# Patient Record
Sex: MALE | Race: BLACK OR AFRICAN AMERICAN | Employment: OTHER | URBAN - METROPOLITAN AREA
[De-identification: names, ages, dates, MRNs, and addresses within clinical notes are randomized per-mention and may not be internally consistent; named-entity substitution may affect disease eponyms.]

---

## 2018-08-29 ENCOUNTER — HOSPITAL ENCOUNTER (EMERGENCY)
Age: 68
Discharge: HOME | End: 2018-08-29
Attending: EMERGENCY MEDICINE | Admitting: EMERGENCY MEDICINE
Payer: MEDICARE

## 2018-08-29 ENCOUNTER — APPOINTMENT (OUTPATIENT)
Dept: GENERAL RADIOLOGY | Age: 68
End: 2018-08-29
Attending: EMERGENCY MEDICINE
Payer: MEDICARE

## 2018-08-29 VITALS
HEART RATE: 62 BPM | HEIGHT: 70 IN | OXYGEN SATURATION: 96 % | DIASTOLIC BLOOD PRESSURE: 79 MMHG | TEMPERATURE: 98.1 F | BODY MASS INDEX: 25.05 KG/M2 | WEIGHT: 175 LBS | SYSTOLIC BLOOD PRESSURE: 169 MMHG | RESPIRATION RATE: 19 BRPM

## 2018-08-29 DIAGNOSIS — R93.89 ABNORMAL CHEST X-RAY: ICD-10-CM

## 2018-08-29 DIAGNOSIS — R03.0 ELEVATED BLOOD PRESSURE READING: ICD-10-CM

## 2018-08-29 DIAGNOSIS — R09.02 HYPOXIA: Primary | ICD-10-CM

## 2018-08-29 LAB
ANION GAP SERPL CALC-SCNC: 6 MMOL/L (ref 3–18)
ATRIAL RATE: 64 BPM
BASOPHILS # BLD: 0 K/UL (ref 0–0.1)
BASOPHILS NFR BLD: 0 % (ref 0–2)
BNP SERPL-MCNC: 548 PG/ML (ref 0–900)
BUN SERPL-MCNC: 17 MG/DL (ref 7–18)
BUN/CREAT SERPL: 18 (ref 12–20)
CALCIUM SERPL-MCNC: 8.6 MG/DL (ref 8.5–10.1)
CALCULATED P AXIS, ECG09: 39 DEGREES
CALCULATED R AXIS, ECG10: 36 DEGREES
CALCULATED T AXIS, ECG11: -120 DEGREES
CHLORIDE SERPL-SCNC: 107 MMOL/L (ref 100–108)
CO2 SERPL-SCNC: 26 MMOL/L (ref 21–32)
CREAT SERPL-MCNC: 0.95 MG/DL (ref 0.6–1.3)
DIAGNOSIS, 93000: NORMAL
DIFFERENTIAL METHOD BLD: ABNORMAL
EOSINOPHIL # BLD: 0.2 K/UL (ref 0–0.4)
EOSINOPHIL NFR BLD: 3 % (ref 0–5)
ERYTHROCYTE [DISTWIDTH] IN BLOOD BY AUTOMATED COUNT: 14.3 % (ref 11.6–14.5)
ETHANOL SERPL-MCNC: <3 MG/DL (ref 0–3)
GLUCOSE SERPL-MCNC: 97 MG/DL (ref 74–99)
HCT VFR BLD AUTO: 39.2 % (ref 36–48)
HGB BLD-MCNC: 13.2 G/DL (ref 13–16)
LYMPHOCYTES # BLD: 2.1 K/UL (ref 0.9–3.6)
LYMPHOCYTES NFR BLD: 41 % (ref 21–52)
MAGNESIUM SERPL-MCNC: 2.1 MG/DL (ref 1.6–2.6)
MCH RBC QN AUTO: 29.7 PG (ref 24–34)
MCHC RBC AUTO-ENTMCNC: 33.7 G/DL (ref 31–37)
MCV RBC AUTO: 88.1 FL (ref 74–97)
MONOCYTES # BLD: 0.4 K/UL (ref 0.05–1.2)
MONOCYTES NFR BLD: 8 % (ref 3–10)
NEUTS SEG # BLD: 2.5 K/UL (ref 1.8–8)
NEUTS SEG NFR BLD: 48 % (ref 40–73)
P-R INTERVAL, ECG05: 160 MS
PLATELET # BLD AUTO: 191 K/UL (ref 135–420)
PMV BLD AUTO: 10.9 FL (ref 9.2–11.8)
POTASSIUM SERPL-SCNC: 3.8 MMOL/L (ref 3.5–5.5)
Q-T INTERVAL, ECG07: 412 MS
QRS DURATION, ECG06: 104 MS
QTC CALCULATION (BEZET), ECG08: 425 MS
RBC # BLD AUTO: 4.45 M/UL (ref 4.7–5.5)
SODIUM SERPL-SCNC: 139 MMOL/L (ref 136–145)
TROPONIN I SERPL-MCNC: <0.02 NG/ML (ref 0–0.04)
VENTRICULAR RATE, ECG03: 64 BPM
WBC # BLD AUTO: 5.2 K/UL (ref 4.6–13.2)

## 2018-08-29 PROCEDURE — 84484 ASSAY OF TROPONIN QUANT: CPT | Performed by: EMERGENCY MEDICINE

## 2018-08-29 PROCEDURE — 74011250636 HC RX REV CODE- 250/636: Performed by: EMERGENCY MEDICINE

## 2018-08-29 PROCEDURE — 83880 ASSAY OF NATRIURETIC PEPTIDE: CPT | Performed by: EMERGENCY MEDICINE

## 2018-08-29 PROCEDURE — 74011000250 HC RX REV CODE- 250: Performed by: EMERGENCY MEDICINE

## 2018-08-29 PROCEDURE — 71045 X-RAY EXAM CHEST 1 VIEW: CPT

## 2018-08-29 PROCEDURE — 77030029684 HC NEB SM VOL KT MONA -A

## 2018-08-29 PROCEDURE — 93005 ELECTROCARDIOGRAM TRACING: CPT

## 2018-08-29 PROCEDURE — 96361 HYDRATE IV INFUSION ADD-ON: CPT

## 2018-08-29 PROCEDURE — 80048 BASIC METABOLIC PNL TOTAL CA: CPT | Performed by: EMERGENCY MEDICINE

## 2018-08-29 PROCEDURE — 83735 ASSAY OF MAGNESIUM: CPT | Performed by: EMERGENCY MEDICINE

## 2018-08-29 PROCEDURE — 96374 THER/PROPH/DIAG INJ IV PUSH: CPT

## 2018-08-29 PROCEDURE — 94640 AIRWAY INHALATION TREATMENT: CPT

## 2018-08-29 PROCEDURE — 80307 DRUG TEST PRSMV CHEM ANLYZR: CPT | Performed by: EMERGENCY MEDICINE

## 2018-08-29 PROCEDURE — 99285 EMERGENCY DEPT VISIT HI MDM: CPT

## 2018-08-29 PROCEDURE — 74011250637 HC RX REV CODE- 250/637: Performed by: EMERGENCY MEDICINE

## 2018-08-29 PROCEDURE — 96375 TX/PRO/DX INJ NEW DRUG ADDON: CPT

## 2018-08-29 PROCEDURE — 85025 COMPLETE CBC W/AUTO DIFF WBC: CPT | Performed by: EMERGENCY MEDICINE

## 2018-08-29 RX ORDER — LEVOFLOXACIN 750 MG/1
750 TABLET ORAL DAILY
Qty: 5 TAB | Refills: 0 | Status: SHIPPED | OUTPATIENT
Start: 2018-08-29 | End: 2019-03-08

## 2018-08-29 RX ORDER — ALBUTEROL SULFATE 0.83 MG/ML
2.5 SOLUTION RESPIRATORY (INHALATION)
Status: DISCONTINUED | OUTPATIENT
Start: 2018-08-29 | End: 2018-08-29 | Stop reason: HOSPADM

## 2018-08-29 RX ORDER — FUROSEMIDE 10 MG/ML
40 INJECTION INTRAMUSCULAR; INTRAVENOUS
Status: COMPLETED | OUTPATIENT
Start: 2018-08-29 | End: 2018-08-29

## 2018-08-29 RX ORDER — LEVOFLOXACIN 750 MG/1
750 TABLET ORAL
Status: COMPLETED | OUTPATIENT
Start: 2018-08-29 | End: 2018-08-29

## 2018-08-29 RX ADMIN — FUROSEMIDE 40 MG: 10 INJECTION, SOLUTION INTRAMUSCULAR; INTRAVENOUS at 07:06

## 2018-08-29 RX ADMIN — SODIUM CHLORIDE 1000 ML: 900 INJECTION, SOLUTION INTRAVENOUS at 06:35

## 2018-08-29 RX ADMIN — ALBUTEROL SULFATE 2.5 MG: 2.5 SOLUTION RESPIRATORY (INHALATION) at 06:28

## 2018-08-29 RX ADMIN — LEVOFLOXACIN 750 MG: 750 TABLET, FILM COATED ORAL at 08:43

## 2018-08-29 RX ADMIN — METHYLPREDNISOLONE SODIUM SUCCINATE 125 MG: 125 INJECTION, POWDER, FOR SOLUTION INTRAMUSCULAR; INTRAVENOUS at 06:33

## 2018-08-29 NOTE — ED NOTES
Witnessed conversation between pt and Dr Nettie Anderson. Pt insistent on going to work. Wants to be discharged despite risks discussed.

## 2018-08-29 NOTE — ED TRIAGE NOTES
Patient awoke from sleep with difficulty breathing. EMS administered albuterol neb x1. Denies chest pain.

## 2018-08-29 NOTE — DISCHARGE INSTRUCTIONS
Learning About Hypoxemia  What is hypoxemia? Hypoxemia means that you don't have enough oxygen in your blood. It's a result of diseases that affect your heart or lungs. These include heart failure, COPD, and pulmonary fibrosis (scarring of the lungs). Being at high altitudes can also lead to hypoxemia. What happens when you have hypoxemia? Oxygen gets into your blood through your lungs. Your blood carries the oxygen to all parts of your body. When you have too little oxygen in your blood, your body doesn't get enough of it. With too little oxygen, your heart and other parts of your body don't work very well. What are the symptoms? In addition to the symptoms of whatever is causing your hypoxemia, you may:  · Get tired quickly. · Be short of breath when you are active. · Feel like your heart is pounding or racing. · Feel weak or dizzy. · Become confused. How is hypoxemia treated? Your doctor will do tests to find out how much oxygen is in your blood. He or she will look for the cause of your hypoxemia and treat that problem. For example, if you have heart failure, you may need medicines that help your heart pump better. · If your hypoxemia is not severe, your doctor may give you oxygen through a mask or nasal cannula (say \"MORRIS-lindah-tamiko\"). A cannula is a thin tube with two openings that fit just inside your nose. · If your hypoxemia is severe, you may have a breathing tube put into your windpipe. The breathing tube is attached to a machine that pushes air into your lungs. This machine is called a ventilator. · If you have a long-term problem with hypoxemia, your doctor may recommend that you use oxygen regularly. Some people need it all the time. Others need it from time to time throughout the day or overnight. Your doctor will tell you how much oxygen you need and how often to use it. Follow-up care is a key part of your treatment and safety.  Be sure to make and go to all appointments, and call your doctor if you are having problems. It's also a good idea to know your test results and keep a list of the medicines you take. Where can you learn more? Go to http://dagoberto-leydi.info/. Enter M375 in the search box to learn more about \"Learning About Hypoxemia. \"  Current as of: December 6, 2017  Content Version: 11.7  © 2733-4094 [employer]. Care instructions adapted under license by [employer] (which disclaims liability or warranty for this information). If you have questions about a medical condition or this instruction, always ask your healthcare professional. [place] [address] any warranty or liability for your use of this information.

## 2018-08-29 NOTE — ED NOTES
Bedside shift change report given to [address] (oncoming nurse) by Erica Marr (offgoing nurse). Report included the following information SBAR.

## 2018-08-29 NOTE — ED PROVIDER NOTES
EMERGENCY DEPARTMENT HISTORY AND PHYSICAL EXAM 
 
6:25 AM 
 
 
Date: 8/29/2018 Patient Name: Rafa Guerra History of Presenting Illness Chief Complaint Patient presents with  Shortness of Breath History Provided By: Patient Chief Complaint: SOB Duration:  Last night Timing:  Acute Location: N/A Quality: N/A Severity: Moderate Modifying Factors: No relief after 1 albuterol tx by EMS Associated Symptoms: denies any other associated signs or symptoms Additional History (Context): 6:26 AM Rafa Guerra is a 79 y.o. male with h/o ETOH abuse who presents to ED complaining of moderate acute SOB onset last night/early this morning. Patient states that he was watching tv when he went to sleep and woke feeling SOB. Patient denies any chest pain, cough, fever, or chills. Denies any MHx of asthma, COPD, HTN, DM, Kidney issues, or cardiac problems. He states he does not smoke, drink ETOH, or use any drugs. Patient reports that he is not supposed supposed to be on CPAP at night. No other concerns or symptoms at this time. PCP: Jim Gao MD  
 
Current Facility-Administered Medications Medication Dose Route Frequency Provider Last Rate Last Dose  albuterol (PROVENTIL VENTOLIN) nebulizer solution 2.5 mg  2.5 mg Nebulization Q15MIN PRN Poppy Mcclure MD   2.5 mg at 08/29/18 4792 Current Outpatient Prescriptions Medication Sig Dispense Refill  levoFLOXacin (LEVAQUIN) 750 mg tablet Take 1 Tab by mouth daily. 5 Tab 0  
 OTHER Past History Past Medical History: 
Past Medical History:  
Diagnosis Date  ETOH abuse  Glaucoma Past Surgical History: 
Past Surgical History:  
Procedure Laterality Date  HX WISDOM TEETH EXTRACTION Family History: 
Family History Problem Relation Age of Onset  Alcohol abuse Other Social History: 
Social History Substance Use Topics  Smoking status: Former Smoker  Smokeless tobacco: Never Used Comment: wearing a patch  Alcohol use Yes Allergies: 
No Known Allergies Review of Systems Review of Systems Constitutional: Negative for chills and fever. Respiratory: Positive for shortness of breath. Negative for cough. Cardiovascular: Negative for chest pain. All other systems reviewed and are negative. Visit Vitals  /79  Pulse 62  Temp 98.1 °F (36.7 °C)  Resp 19  
 Ht 5' 9.5\" (1.765 m)  Wt 79.4 kg (175 lb)  SpO2 96%  BMI 25.47 kg/m2 Physical Exam / Medical Decision Making I am the first provider for this patient. I reviewed the vital signs, available nursing notes, past medical history, past surgical history, family history and social history. Vital Signs-Reviewed the patient's vital signs. Physical exam: 
General:  Well-developed, well-nourished, minimal respiratory distress normal to touch nontoxic nondiaphoretic. Head:  Normocephalic atraumatic. Eyes:  Pupils midrange extraocular movements intact. No pallor or conjunctival injection. Nose:  No rhinorrhea, inspection grossly normal.   
Ears:  Grossly normal to inspection, no discharge. Mouth:  Mucous membranes moist, no appreciable intraoral lesion. Neck/Back:  Trachea midline, no asymmetry. No JVD Chest:  Grossly normal inspection, symmetric chest rise. Pulmonary: Prolonged expiratory phase rhonchorous on LEFT Cardiovascular:  S1-S2 no murmurs rubs or gallops. Abdomen: Soft, nontender, nondistended no guarding rebound or peritoneal signs. Extremities:  Grossly normal to inspection, peripheral pulses intact  no distal edema no pain on palpation of the extremities Neurologic:  Alert and oriented no appreciable focal neurologic deficit Skin:  Warm and dry Psychiatric:  Grossly normal mood and affect Nursing note reviewed, vital signs reviewed. ED course: Patient presents by EMS with acute onset of shortness breath, reportedly well before he fell asleep, he came in with shortness breath without chest pain, saturation low at 80% on room air 89% on 2 L/m nasal cannula, has abnormal lung sounds, will treat his COPD rule out ACS has no antecedent cough or fever, unlikely a infectious process Monitor normal sinus rhythm EKG done at 0635 hours sinus rhythm heart rate 64 one episode of ventricular ectopy, has LVH with what seems to be repolarization abnormality . Chest x-ray bilateral airspace opacity LEFT greater than RIGHT Given Lasix Reevaluated, saturation is low 90s on 6 L/m nasal cannula, he is well-appearing and insistent that he must go to work. AAO ×3 not clinically intoxicated, has a history of alcohol use in by his report in remission for multiple years, understands that leaving the hospital with a low oxygen, elevated blood pressure is normally risk for returning sicker but may be a waste of his life and could lead to his death. He understands this. Patient reevaluated at 0800 hrs.: Discussed his negative labs, prior CTA that needed to be followed up for possible malignancy Patient still reports that he wants to be discharged, dyspnea work at 8:30, he was trialed off of oxygen will trial ambulation here he is diuresed very well his blood pressure is now in the 870 systolic. Chest x-ray per radiology bilateral airspace opacity possible infectious versus cardiac Saturation 84% off of oxygen nonlabored this time still insistent that he would want to leave we'll trial ambulation to prove that he has severe symptoms and he needs to stay in hospital 
 
0815 Patient ambulated around the department without supplemental oxygen with no difficulty repeat oxygen saturation after ambulation was 83 Reevaluated 0830 hrs.: Patient still voices wish to be discharged, he does not have a job which requires exertion but this is still a danger to his life.  He will follow-up with his primary care physician at consult placed to his primary care physician to obtain close follow-up. Has no fever and no ketty cough but given his chest x-ray will cover with Levaquin, prescription for the same Patient wishes to leave AMA: 
Judgement and insight seem to be intact Patient not intoxicated Family present: None Clinical status/evaluation: Shortness breath hypoxia chest x-ray with bilateral infiltrates concerning for pulmonary edema versus pneumonia Risks of leaving AMA:  Sudden death, disability, chronic pain Alternatives offered: Follow up with PCP, return to ED at any time for further treatment/evaluation . Understands he can return to be admitted at any time Patient will be allowed to sign out [address] at this time, was invited to return to the ED with any concerns whatsoever. Disposition: LEFT AGAINST MEDICAL ADVICE Portions of this chart were created with Dragon medical speech to text program.   Unrecognized errors may be present. Diagnostic Study Results Labs - Recent Results (from the past 12 hour(s)) EKG, 12 LEAD, INITIAL Collection Time: 08/29/18  6:35 AM  
Result Value Ref Range Ventricular Rate 64 BPM  
 Atrial Rate 64 BPM  
 P-R Interval 160 ms QRS Duration 104 ms Q-T Interval 412 ms QTC Calculation (Bezet) 425 ms Calculated P Axis 39 degrees Calculated R Axis 36 degrees Calculated T Axis -120 degrees Diagnosis Sinus rhythm with occasional premature ventricular complexes Left ventricular hypertrophy with repolarization abnormality Abnormal ECG When compared with ECG of 31-DEC-2016 12:51, 
premature ventricular complexes are now present CBC WITH AUTOMATED DIFF Collection Time: 08/29/18  6:37 AM  
Result Value Ref Range WBC 5.2 4.6 - 13.2 K/uL  
 RBC 4.45 (L) 4.70 - 5.50 M/uL  
 HGB 13.2 13.0 - 16.0 g/dL HCT 39.2 36.0 - 48.0 %  MCV 88.1 74.0 - 97.0 FL  
 MCH 29.7 24.0 - 34.0 PG  
 MCHC 33.7 31.0 - 37.0 g/dL  
 RDW 14.3 11.6 - 14.5 % PLATELET 877 712 - 415 K/uL MPV 10.9 9.2 - 11.8 FL  
 NEUTROPHILS 48 40 - 73 % LYMPHOCYTES 41 21 - 52 % MONOCYTES 8 3 - 10 % EOSINOPHILS 3 0 - 5 % BASOPHILS 0 0 - 2 %  
 ABS. NEUTROPHILS 2.5 1.8 - 8.0 K/UL  
 ABS. LYMPHOCYTES 2.1 0.9 - 3.6 K/UL  
 ABS. MONOCYTES 0.4 0.05 - 1.2 K/UL  
 ABS. EOSINOPHILS 0.2 0.0 - 0.4 K/UL  
 ABS. BASOPHILS 0.0 0.0 - 0.1 K/UL  
 DF AUTOMATED METABOLIC PANEL, BASIC Collection Time: 08/29/18  6:37 AM  
Result Value Ref Range Sodium 139 136 - 145 mmol/L Potassium 3.8 3.5 - 5.5 mmol/L Chloride 107 100 - 108 mmol/L  
 CO2 26 21 - 32 mmol/L Anion gap 6 3.0 - 18 mmol/L Glucose 97 74 - 99 mg/dL BUN 17 7.0 - 18 MG/DL Creatinine 0.95 0.6 - 1.3 MG/DL  
 BUN/Creatinine ratio 18 12 - 20 GFR est AA >60 >60 ml/min/1.73m2 GFR est non-AA >60 >60 ml/min/1.73m2 Calcium 8.6 8.5 - 10.1 MG/DL MAGNESIUM Collection Time: 08/29/18  6:37 AM  
Result Value Ref Range Magnesium 2.1 1.6 - 2.6 mg/dL TROPONIN I Collection Time: 08/29/18  6:37 AM  
Result Value Ref Range Troponin-I, Qt. <0.02 0.0 - 0.045 NG/ML  
NT-PRO BNP Collection Time: 08/29/18  6:37 AM  
Result Value Ref Range NT pro- 0 - 900 PG/ML Radiologic Studies -  
XR CHEST PORT Final Result IMPRESSION:  
  
  
1. New diffuse bilateral interstitial alveolar infiltrates. Heart and pulmonary  
vascularity appear normal making diffuse bilateral pneumonia more likely  
consideration than pulmonary edema. 2. Probable pleural thickening causing chronic blunting right costophrenic  
angle. Diagnosis Clinical Impression: 1. Hypoxia 2. Elevated blood pressure reading 3. Abnormal chest x-ray Follow-up Information Follow up With Details Comments Contact Info Luh Batista MD Call today  [address] [place] [id number] [phone] [facility] EMERGENCY DEPT  As needed, If symptoms worsen or if you change your mind and want to be admitted. [address] 
[phone] Patient's Medications Start Taking LEVOFLOXACIN (LEVAQUIN) 750 MG TABLET    Take 1 Tab by mouth daily. Continue Taking OTHER These Medications have changed No medications on file Stop Taking GUAIFENESIN-CODEINE (CHERATUSSIN AC) 100-10 MG/5 ML SOLUTION    Take 5 mL by mouth three (3) times daily as needed for Cough or Congestion. Max Daily Amount: 15 mL. Indications: COUGH  
 
_______________________________ Attestations: 
Scribe Attestation Cheli Mari acting as a scribe for and in the presence of Nimesh Solis MD     
August 29, 2018 at 6:25 AM 
    
Provider Attestation:     
I personally performed the services described in the documentation, reviewed the documentation, as recorded by the scribe in my presence, and it accurately and completely records my words and actions. August 29, 2018 at 6:25 AM - Nimesh Solis MD   
_______________________________

## 2019-03-08 ENCOUNTER — APPOINTMENT (OUTPATIENT)
Dept: GENERAL RADIOLOGY | Age: 69
End: 2019-03-08
Attending: EMERGENCY MEDICINE
Payer: MEDICARE

## 2019-03-08 ENCOUNTER — HOSPITAL ENCOUNTER (EMERGENCY)
Age: 69
Discharge: HOME | End: 2019-03-08
Attending: EMERGENCY MEDICINE
Payer: MEDICARE

## 2019-03-08 VITALS
TEMPERATURE: 98.2 F | BODY MASS INDEX: 25.18 KG/M2 | SYSTOLIC BLOOD PRESSURE: 175 MMHG | OXYGEN SATURATION: 100 % | HEIGHT: 69 IN | DIASTOLIC BLOOD PRESSURE: 86 MMHG | WEIGHT: 170 LBS | HEART RATE: 82 BPM | RESPIRATION RATE: 17 BRPM

## 2019-03-08 DIAGNOSIS — J06.9 ACUTE UPPER RESPIRATORY INFECTION: Primary | ICD-10-CM

## 2019-03-08 PROCEDURE — 99282 EMERGENCY DEPT VISIT SF MDM: CPT

## 2019-03-08 PROCEDURE — 71046 X-RAY EXAM CHEST 2 VIEWS: CPT

## 2019-03-08 RX ORDER — FLUTICASONE PROPIONATE 50 MCG
2 SPRAY, SUSPENSION (ML) NASAL DAILY
Qty: 1 BOTTLE | Refills: 0 | Status: SHIPPED | OUTPATIENT
Start: 2019-03-08 | End: 2020-05-04 | Stop reason: ALTCHOICE

## 2019-03-08 NOTE — LETTER
[facility] EMERGENCY DEPT 
[address] [facility] [id number] 
[phone] Work/School Note Date: 3/8/2019 To Whom It May concern: 
 
Mushtaq Busch was seen and treated today in the emergency room by the following provider(s): 
Attending Provider: Carla Chowdhury MD 
Physician Assistant: LAWANDA Nicole. Mushtaq Busch may return to work on 03/12/2019 Sincerely, Kirsten Goins RN

## 2019-03-08 NOTE — DISCHARGE INSTRUCTIONS
Patient Education        Upper Respiratory Infection (Cold): Care Instructions  Your Care Instructions    An upper respiratory infection, or URI, is an infection of the nose, sinuses, or throat. URIs are spread by coughs, sneezes, and direct contact. The common cold is the most frequent kind of URI. The flu and sinus infections are other kinds of URIs. Almost all URIs are caused by viruses. Antibiotics won't cure them. But you can treat most infections with home care. This may include drinking lots of fluids and taking over-the-counter pain medicine. You will probably feel better in 4 to 10 days. The doctor has checked you carefully, but problems can develop later. If you notice any problems or new symptoms, get medical treatment right away. Follow-up care is a key part of your treatment and safety. Be sure to make and go to all appointments, and call your doctor if you are having problems. It's also a good idea to know your test results and keep a list of the medicines you take. How can you care for yourself at home? · To prevent dehydration, drink plenty of fluids, enough so that your urine is light yellow or clear like water. Choose water and other caffeine-free clear liquids until you feel better. If you have kidney, heart, or liver disease and have to limit fluids, talk with your doctor before you increase the amount of fluids you drink. · Take an over-the-counter pain medicine, such as acetaminophen (Tylenol), ibuprofen (Advil, Motrin), or naproxen (Aleve). Read and follow all instructions on the label. · Before you use cough and cold medicines, check the label. These medicines may not be safe for young children or for people with certain health problems. · Be careful when taking over-the-counter cold or flu medicines and Tylenol at the same time. Many of these medicines have acetaminophen, which is Tylenol. Read the labels to make sure that you are not taking more than the recommended dose.  Too much acetaminophen (Tylenol) can be harmful. · Get plenty of rest.  · Do not smoke or allow others to smoke around you. If you need help quitting, talk to your doctor about stop-smoking programs and medicines. These can increase your chances of quitting for good. When should you call for help? Call 911 anytime you think you may need emergency care. For example, call if:    · You have severe trouble breathing.    Call your doctor now or seek immediate medical care if:    · You seem to be getting much sicker.     · You have new or worse trouble breathing.     · You have a new or higher fever.     · You have a new rash.    Watch closely for changes in your health, and be sure to contact your doctor if:    · You have a new symptom, such as a sore throat, an earache, or sinus pain.     · You cough more deeply or more often, especially if you notice more mucus or a change in the color of your mucus.     · You do not get better as expected. Where can you learn more? Go to http://dagoberto-leydi.info/. Enter M733 in the search box to learn more about \"Upper Respiratory Infection (Cold): Care Instructions. \"  Current as of: September 5, 2018  Content Version: 11.9  © 0022-0824 [employer]. Care instructions adapted under license by [employer] (which disclaims liability or warranty for this information). If you have questions about a medical condition or this instruction, always ask your healthcare professional. [place] [address] any warranty or liability for your use of this information.

## 2019-03-08 NOTE — ED PROVIDER NOTES
EMERGENCY DEPARTMENT HISTORY AND PHYSICAL EXAM    Date: 3/8/2019  Patient Name: Robin Fontanez    History of Presenting Illness     Chief Complaint   Patient presents with    Cough    Nasal Congestion    Generalized Body Aches         History Provided By: Patient    Chief Complaint: cough  Duration: 3 Days  Timing:  Acute  Location: chest  Quality: Aching and Tightness  Severity: Moderate  Modifying Factors: taking nyquil w/o relief  Associated Symptoms: congestion, sore thorat, body aches, chills, fatigue      Additional History (Context): Robin Fontanez is a 76 y.o. male with see below who presents with cough, congestion, chills, body aches, sore throat, and fatigue  X3d. Taking nyquil w/o relief. Denies fever. PCP: Rachel Bonilla MD    Current Outpatient Medications   Medication Sig Dispense Refill    Camphor-Eucalyptus Oil-Menthol (VICKS VAPORUB) 4.8-1.2-2.6 % oint 1 Actuation(s) by Apply Externally route three (3) times daily as needed for Cough or Other (congestion). 50 g 0    dextromethorphan-guaiFENesin (ROBITUSSIN-DM)  mg/5 mL syrup Take 10 mL by mouth every six (6) hours as needed for Cough. 240 mL 0    fluticasone (FLONASE) 50 mcg/actuation nasal spray 2 Sprays by Both Nostrils route daily. 1 Bottle 0    dextromethorphan-guaiFENesin (CORICIDIN HBP)  mg cap Take 1 Cap by mouth two (2) times daily as needed for Cough. 20 Cap 0    OTHER          Past History     Past Medical History:  Past Medical History:   Diagnosis Date    ETOH abuse     Glaucoma        Past Surgical History:  Past Surgical History:   Procedure Laterality Date    HX WISDOM TEETH EXTRACTION         Family History:  Family History   Problem Relation Age of Onset    Alcohol abuse Other        Social History:  Social History     Tobacco Use    Smoking status: Former Smoker    Smokeless tobacco: Never Used    Tobacco comment: wearing a patch   Substance Use Topics    Alcohol use: Yes    Drug use:  No Allergies:  No Known Allergies      Review of Systems   Review of Systems   Constitutional: Positive for chills and fatigue. Negative for fever. HENT: Positive for congestion and sore throat. Respiratory: Positive for cough. Negative for shortness of breath. Musculoskeletal: Positive for myalgias. All Other Systems Negative  Physical Exam     Vitals:    03/08/19 0944   BP: 175/86   Pulse: 82   Resp: 17   Temp: 98.2 °F (36.8 °C)   SpO2: 100%   Weight: 77.1 kg (170 lb)   Height: 5' 9\" (1.753 m)     Physical Exam   Constitutional: Vital signs are normal. He appears well-developed and well-nourished. He is active. Non-toxic appearance. He does not appear ill. No distress. HENT:   Head: Normocephalic and atraumatic. Neck: Normal range of motion. Neck supple. Carotid bruit is not present. No tracheal deviation present. No thyromegaly present. Cardiovascular: Normal rate, regular rhythm and normal heart sounds. Exam reveals no gallop and no friction rub. No murmur heard. Pulmonary/Chest: Effort normal. No stridor. No respiratory distress. He has no wheezes. He has no rales. He exhibits no tenderness. BS diminished; + rhonchi   Abdominal: Soft. He exhibits no distension and no mass. There is no tenderness. There is no rebound, no guarding and no CVA tenderness. Musculoskeletal: Normal range of motion. Neurological: He is alert. Skin: Skin is warm, dry and intact. He is not diaphoretic. No pallor. Psychiatric: He has a normal mood and affect. His speech is normal and behavior is normal. Judgment and thought content normal.   Nursing note and vitals reviewed. Diagnostic Study Results     Labs -   No results found for this or any previous visit (from the past 12 hour(s)). Radiologic Studies -   XR CHEST PA LAT   Final Result   IMPRESSION: Chronic appearing blunting of the right costophrenic angle with   adjacent scarring or atelectasis.  No superimposed acute radiographic abnormality. CT Results  (Last 48 hours)    None        CXR Results  (Last 48 hours)               03/08/19 1044  XR CHEST PA LAT Final result    Impression:  IMPRESSION: Chronic appearing blunting of the right costophrenic angle with   adjacent scarring or atelectasis. No superimposed acute radiographic   abnormality. Narrative:  EXAM: XR CHEST PA LAT       INDICATION: cough       COMPARISON: Several prior exams, most recently 8/29/2018. FINDINGS: PA and lateral radiographs of the chest demonstrate linear opacities   at the right lung base with chronic appearing blunting of the right costophrenic   angle. Clearance of previously noted interstitial and alveolar opacities. No   pneumothorax. Left CP angle clear. Cardiac size and mediastinal contours are   stable. Several chronic fracture deformities of the lateral right ribs are   noted. Medical Decision Making   I am the first provider for this patient. I reviewed the vital signs, available nursing notes, past medical history, past surgical history, family history and social history. Vital Signs-Reviewed the patient's vital signs. Records Reviewed: Nursing Notes    Procedures:  Procedures    Provider Notes (Medical Decision Making): get CXR; shows nothing acute. Treat URI. MED RECONCILIATION:  No current facility-administered medications for this encounter. Current Outpatient Medications   Medication Sig    Camphor-Eucalyptus Oil-Menthol (VICKS VAPORUB) 4.8-1.2-2.6 % oint 1 Actuation(s) by Apply Externally route three (3) times daily as needed for Cough or Other (congestion).  dextromethorphan-guaiFENesin (ROBITUSSIN-DM)  mg/5 mL syrup Take 10 mL by mouth every six (6) hours as needed for Cough.  fluticasone (FLONASE) 50 mcg/actuation nasal spray 2 Sprays by Both Nostrils route daily.     dextromethorphan-guaiFENesin (CORICIDIN HBP)  mg cap Take 1 Cap by mouth two (2) times daily as needed for Cough.  OTHER        Disposition:  home    DISCHARGE NOTE:   11:01 AM    Pt has been reexamined. Patient has no new complaints, changes, or physical findings. Care plan outlined and precautions discussed. Results of cxr were reviewed with the patient. All medications were reviewed with the patient; will d/c home with see below. All of pt's questions and concerns were addressed. Patient was instructed and agrees to follow up with PCP, as well as to return to the ED upon further deterioration. Patient is ready to go home. Follow-up Information     Follow up With Specialties Details Why Contact Info    Ana Huggins MD Family Practice Schedule an appointment as soon as possible for a visit in 1 day  [place] [address] DEPT Emergency Medicine  If symptoms worsen return immediately [address]  [phone]          Current Discharge Medication List      START taking these medications    Details   Camphor-Eucalyptus Oil-Menthol (VICKS VAPORUB) 4.8-1.2-2.6 % oint 1 Actuation(s) by Apply Externally route three (3) times daily as needed for Cough or Other (congestion). Qty: 50 g, Refills: 0      dextromethorphan-guaiFENesin (ROBITUSSIN-DM)  mg/5 mL syrup Take 10 mL by mouth every six (6) hours as needed for Cough. Qty: 240 mL, Refills: 0      fluticasone (FLONASE) 50 mcg/actuation nasal spray 2 Sprays by Both Nostrils route daily. Qty: 1 Bottle, Refills: 0      dextromethorphan-guaiFENesin (CORICIDIN HBP)  mg cap Take 1 Cap by mouth two (2) times daily as needed for Cough. Qty: 20 Cap, Refills: 0             Diagnosis     Clinical Impression:   1.  Acute upper respiratory infection

## 2019-09-08 ENCOUNTER — HOSPITAL ENCOUNTER (EMERGENCY)
Age: 69
Discharge: HOME | End: 2019-09-08
Attending: EMERGENCY MEDICINE | Admitting: EMERGENCY MEDICINE
Payer: MEDICARE

## 2019-09-08 VITALS
RESPIRATION RATE: 16 BRPM | DIASTOLIC BLOOD PRESSURE: 88 MMHG | SYSTOLIC BLOOD PRESSURE: 170 MMHG | TEMPERATURE: 99.5 F | HEIGHT: 69 IN | WEIGHT: 175 LBS | BODY MASS INDEX: 25.92 KG/M2 | HEART RATE: 88 BPM | OXYGEN SATURATION: 97 %

## 2019-09-08 DIAGNOSIS — R10.13 ABDOMINAL PAIN, EPIGASTRIC: ICD-10-CM

## 2019-09-08 DIAGNOSIS — Z78.9 ALCOHOL USE: Primary | ICD-10-CM

## 2019-09-08 LAB
ALBUMIN SERPL-MCNC: 3.9 G/DL (ref 3.4–5)
ALBUMIN/GLOB SERPL: 0.8 {RATIO} (ref 0.8–1.7)
ALP SERPL-CCNC: 91 U/L (ref 45–117)
ALT SERPL-CCNC: 33 U/L (ref 16–61)
ANION GAP SERPL CALC-SCNC: 10 MMOL/L (ref 3–18)
APPEARANCE UR: CLEAR
AST SERPL-CCNC: 44 U/L (ref 10–38)
BACTERIA URNS QL MICRO: NEGATIVE /HPF
BASOPHILS # BLD: 0 K/UL (ref 0–0.1)
BASOPHILS NFR BLD: 0 % (ref 0–2)
BILIRUB SERPL-MCNC: 2.5 MG/DL (ref 0.2–1)
BILIRUB UR QL: NEGATIVE
BUN SERPL-MCNC: 9 MG/DL (ref 7–18)
BUN/CREAT SERPL: 10 (ref 12–20)
CALCIUM SERPL-MCNC: 8.4 MG/DL (ref 8.5–10.1)
CHLORIDE SERPL-SCNC: 103 MMOL/L (ref 100–111)
CO2 SERPL-SCNC: 24 MMOL/L (ref 21–32)
COLOR UR: YELLOW
CREAT SERPL-MCNC: 0.9 MG/DL (ref 0.6–1.3)
DIFFERENTIAL METHOD BLD: ABNORMAL
EOSINOPHIL # BLD: 0 K/UL (ref 0–0.4)
EOSINOPHIL NFR BLD: 0 % (ref 0–5)
EPITH CASTS URNS QL MICRO: NEGATIVE /LPF (ref 0–5)
ERYTHROCYTE [DISTWIDTH] IN BLOOD BY AUTOMATED COUNT: 15.1 % (ref 11.6–14.5)
ETHANOL SERPL-MCNC: 20 MG/DL (ref 0–3)
GLOBULIN SER CALC-MCNC: 4.6 G/DL (ref 2–4)
GLUCOSE SERPL-MCNC: 105 MG/DL (ref 74–99)
GLUCOSE UR STRIP.AUTO-MCNC: NEGATIVE MG/DL
HCT VFR BLD AUTO: 41.7 % (ref 36–48)
HGB BLD-MCNC: 13.8 G/DL (ref 13–16)
HGB UR QL STRIP: NEGATIVE
KETONES UR QL STRIP.AUTO: NEGATIVE MG/DL
LEUKOCYTE ESTERASE UR QL STRIP.AUTO: NEGATIVE
LIPASE SERPL-CCNC: 73 U/L (ref 73–393)
LYMPHOCYTES # BLD: 1.7 K/UL (ref 0.9–3.6)
LYMPHOCYTES NFR BLD: 24 % (ref 21–52)
MAGNESIUM SERPL-MCNC: 2 MG/DL (ref 1.6–2.6)
MCH RBC QN AUTO: 28.4 PG (ref 24–34)
MCHC RBC AUTO-ENTMCNC: 33.1 G/DL (ref 31–37)
MCV RBC AUTO: 85.8 FL (ref 74–97)
MONOCYTES # BLD: 0.5 K/UL (ref 0.05–1.2)
MONOCYTES NFR BLD: 7 % (ref 3–10)
NEUTS SEG # BLD: 5 K/UL (ref 1.8–8)
NEUTS SEG NFR BLD: 69 % (ref 40–73)
NITRITE UR QL STRIP.AUTO: NEGATIVE
PH UR STRIP: 6.5 [PH] (ref 5–8)
PLATELET # BLD AUTO: 239 K/UL (ref 135–420)
PMV BLD AUTO: 10 FL (ref 9.2–11.8)
POTASSIUM SERPL-SCNC: 4.8 MMOL/L (ref 3.5–5.5)
PROT SERPL-MCNC: 8.5 G/DL (ref 6.4–8.2)
PROT UR STRIP-MCNC: 30 MG/DL
RBC # BLD AUTO: 4.86 M/UL (ref 4.7–5.5)
RBC #/AREA URNS HPF: NORMAL /HPF (ref 0–5)
SODIUM SERPL-SCNC: 137 MMOL/L (ref 136–145)
SP GR UR REFRACTOMETRY: 1.01 (ref 1–1.03)
UROBILINOGEN UR QL STRIP.AUTO: 1 EU/DL (ref 0.2–1)
WBC # BLD AUTO: 7.3 K/UL (ref 4.6–13.2)
WBC URNS QL MICRO: NORMAL /HPF (ref 0–4)

## 2019-09-08 PROCEDURE — 99283 EMERGENCY DEPT VISIT LOW MDM: CPT

## 2019-09-08 PROCEDURE — 81001 URINALYSIS AUTO W/SCOPE: CPT

## 2019-09-08 PROCEDURE — 80053 COMPREHEN METABOLIC PANEL: CPT

## 2019-09-08 PROCEDURE — 96374 THER/PROPH/DIAG INJ IV PUSH: CPT

## 2019-09-08 PROCEDURE — 85025 COMPLETE CBC W/AUTO DIFF WBC: CPT

## 2019-09-08 PROCEDURE — 83735 ASSAY OF MAGNESIUM: CPT

## 2019-09-08 PROCEDURE — 83690 ASSAY OF LIPASE: CPT

## 2019-09-08 PROCEDURE — 80307 DRUG TEST PRSMV CHEM ANLYZR: CPT

## 2019-09-08 PROCEDURE — 74011250636 HC RX REV CODE- 250/636: Performed by: EMERGENCY MEDICINE

## 2019-09-08 PROCEDURE — 96375 TX/PRO/DX INJ NEW DRUG ADDON: CPT

## 2019-09-08 RX ORDER — ONDANSETRON 4 MG/1
TABLET, ORALLY DISINTEGRATING ORAL
Qty: 10 TAB | Refills: 0 | Status: SHIPPED | OUTPATIENT
Start: 2019-09-08 | End: 2020-05-04 | Stop reason: ALTCHOICE

## 2019-09-08 RX ORDER — ONDANSETRON 2 MG/ML
4 INJECTION INTRAMUSCULAR; INTRAVENOUS
Status: COMPLETED | OUTPATIENT
Start: 2019-09-08 | End: 2019-09-08

## 2019-09-08 RX ORDER — FAMOTIDINE 10 MG/ML
20 INJECTION INTRAVENOUS
Status: COMPLETED | OUTPATIENT
Start: 2019-09-08 | End: 2019-09-08

## 2019-09-08 RX ADMIN — FAMOTIDINE 20 MG: 10 INJECTION, SOLUTION INTRAVENOUS at 10:20

## 2019-09-08 RX ADMIN — SODIUM CHLORIDE 1000 ML: 900 INJECTION, SOLUTION INTRAVENOUS at 10:13

## 2019-09-08 RX ADMIN — ONDANSETRON 4 MG: 2 INJECTION INTRAMUSCULAR; INTRAVENOUS at 10:20

## 2019-09-08 NOTE — LETTER
[address] [facility] EMERGENCY DEPT 
[address] 
[address] [facility] [address] [phone] Work/School Note Date: 9/8/2019 To Whom It May concern: 
 
Xiomara Alcantar was seen and treated today in the emergency room by the following provider(s): 
Attending Provider: Víctor Duncan MD. Xiomara Alcantar may return to work on 9/11/2019. Mr Norma Ko has been ill from 9/10/2019 and unable to report to work. . 
 
Sincerely, Shellie Roblero MD

## 2019-09-08 NOTE — ED NOTES
Patient states he stopped drinking about 12 years ago, states \"I was tired of being sick and tired\", states he had a glass of wine 3 days ago, \"I think it did not agree with me\"

## 2019-09-08 NOTE — ED TRIAGE NOTES
Pt presents for centralized abd pain, lack of appetite x 3 days, vomiting x 2, diarrhea x 2. Denies fever, dysuria, recent surgeries. ETOH weekly. No interventions at home.

## 2019-09-08 NOTE — ED PROVIDER NOTES
EMERGENCY DEPARTMENT HISTORY AND PHYSICAL EXAM    10:08 AM      Date: 9/8/2019  Patient Name: Christopher Patel    History of Presenting Illness     Chief Complaint   Patient presents with    Abdominal Pain    Fatigue         History Provided By: Patient      Additional History (Context): Christopher Patel is a 76 y.o. male who presents with epigastric pain. Patient states that he has not had a drink in approximately 10 years had some wine last week and for the last 3 or 4 days has not been able to hold down any fluids he is vomited approximately 4 times with some mild diarrhea he denies any hematemesis or melena he is having some mild epigastric pain denies any back pain eats he feels weak due to his lack being able to tolerate anything p.o. He does have a history of heavy alcohol use in the past none for the past 10 years. He denies any abdominal surgeries. PCP: Ashwini Aleman MD      Current Outpatient Medications   Medication Sig Dispense Refill    ondansetron (ZOFRAN ODT) 4 mg disintegrating tablet Take 1-2 tablets every 6-8 hours as needed for nausea and vomiting. 10 Tab 0    Camphor-Eucalyptus Oil-Menthol (VICKS VAPORUB) 4.8-1.2-2.6 % oint 1 Actuation(s) by Apply Externally route three (3) times daily as needed for Cough or Other (congestion). 50 g 0    dextromethorphan-guaiFENesin (ROBITUSSIN-DM)  mg/5 mL syrup Take 10 mL by mouth every six (6) hours as needed for Cough. 240 mL 0    fluticasone (FLONASE) 50 mcg/actuation nasal spray 2 Sprays by Both Nostrils route daily. 1 Bottle 0    dextromethorphan-guaiFENesin (CORICIDIN HBP)  mg cap Take 1 Cap by mouth two (2) times daily as needed for Cough.  20 Cap 0    OTHER          Past History     Past Medical History:  Past Medical History:   Diagnosis Date    ETOH abuse     Glaucoma        Past Surgical History:  Past Surgical History:   Procedure Laterality Date    HX HEENT      HX WISDOM TEETH EXTRACTION         Family History:  Family History   Problem Relation Age of Onset    Alcohol abuse Other        Social History:  Social History     Tobacco Use    Smoking status: Former Smoker    Smokeless tobacco: Never Used    Tobacco comment: wearing a patch   Substance Use Topics    Alcohol use: Yes     Comment: rarely    Drug use: No       Allergies:  No Known Allergies      Review of Systems       Review of Systems   Constitutional: Positive for appetite change and fatigue. Negative for chills and fever. Respiratory: Negative for shortness of breath. Cardiovascular: Negative for chest pain. Gastrointestinal: Positive for abdominal pain, diarrhea, nausea and vomiting. Genitourinary: Positive for decreased urine volume. Skin: Negative for rash. Neurological: Positive for light-headedness. Negative for dizziness, syncope and weakness. All other systems reviewed and are negative. Physical Exam     Visit Vitals  /69   Pulse 88   Temp 99.5 °F (37.5 °C)   Resp 16   Ht 5' 9\" (1.753 m)   Wt 79.4 kg (175 lb)   SpO2 97%   BMI 25.84 kg/m²       Physical Exam   Constitutional: He is oriented to person, place, and time. He appears well-developed and well-nourished. No distress. HENT:   Head: Normocephalic and atraumatic. Mouth/Throat: Oropharynx is clear and moist.   Eyes: Pupils are equal, round, and reactive to light. Conjunctivae and EOM are normal. No scleral icterus. Neck: Normal range of motion. Neck supple. Cardiovascular: Normal rate, regular rhythm and normal heart sounds. No murmur heard. Pulmonary/Chest: Effort normal and breath sounds normal. No respiratory distress. Abdominal: Soft. Bowel sounds are normal. He exhibits no distension. There is tenderness. Mild epigastric tenderness no rebound no guarding   Musculoskeletal: He exhibits no edema. Lymphadenopathy:     He has no cervical adenopathy. Neurological: He is alert and oriented to person, place, and time.  Coordination normal.   Skin: Skin is warm and dry. No rash noted. Psychiatric: He has a normal mood and affect. His behavior is normal.   Nursing note and vitals reviewed. Diagnostic Study Results     Labs -  Recent Results (from the past 12 hour(s))   CBC WITH AUTOMATED DIFF    Collection Time: 09/08/19  9:54 AM   Result Value Ref Range    WBC 7.3 4.6 - 13.2 K/uL    RBC 4.86 4.70 - 5.50 M/uL    HGB 13.8 13.0 - 16.0 g/dL    HCT 41.7 36.0 - 48.0 %    MCV 85.8 74.0 - 97.0 FL    MCH 28.4 24.0 - 34.0 PG    MCHC 33.1 31.0 - 37.0 g/dL    RDW 15.1 (H) 11.6 - 14.5 %    PLATELET 476 273 - 088 K/uL    MPV 10.0 9.2 - 11.8 FL    NEUTROPHILS 69 40 - 73 %    LYMPHOCYTES 24 21 - 52 %    MONOCYTES 7 3 - 10 %    EOSINOPHILS 0 0 - 5 %    BASOPHILS 0 0 - 2 %    ABS. NEUTROPHILS 5.0 1.8 - 8.0 K/UL    ABS. LYMPHOCYTES 1.7 0.9 - 3.6 K/UL    ABS. MONOCYTES 0.5 0.05 - 1.2 K/UL    ABS. EOSINOPHILS 0.0 0.0 - 0.4 K/UL    ABS. BASOPHILS 0.0 0.0 - 0.1 K/UL    DF AUTOMATED     METABOLIC PANEL, COMPREHENSIVE    Collection Time: 09/08/19  9:54 AM   Result Value Ref Range    Sodium 137 136 - 145 mmol/L    Potassium 4.8 3.5 - 5.5 mmol/L    Chloride 103 100 - 111 mmol/L    CO2 24 21 - 32 mmol/L    Anion gap 10 3.0 - 18 mmol/L    Glucose 105 (H) 74 - 99 mg/dL    BUN 9 7.0 - 18 MG/DL    Creatinine 0.90 0.6 - 1.3 MG/DL    BUN/Creatinine ratio 10 (L) 12 - 20      GFR est AA >60 >60 ml/min/1.73m2    GFR est non-AA >60 >60 ml/min/1.73m2    Calcium 8.4 (L) 8.5 - 10.1 MG/DL    Bilirubin, total 2.5 (H) 0.2 - 1.0 MG/DL    ALT (SGPT) 33 16 - 61 U/L    AST (SGOT) 44 (H) 10 - 38 U/L    Alk.  phosphatase 91 45 - 117 U/L    Protein, total 8.5 (H) 6.4 - 8.2 g/dL    Albumin 3.9 3.4 - 5.0 g/dL    Globulin 4.6 (H) 2.0 - 4.0 g/dL    A-G Ratio 0.8 0.8 - 1.7     MAGNESIUM    Collection Time: 09/08/19  9:54 AM   Result Value Ref Range    Magnesium 2.0 1.6 - 2.6 mg/dL   LIPASE    Collection Time: 09/08/19  9:54 AM   Result Value Ref Range    Lipase 73 73 - 393 U/L   URINALYSIS W/ RFLX MICROSCOPIC Collection Time: 09/08/19  9:54 AM   Result Value Ref Range    Color YELLOW      Appearance CLEAR      Specific gravity 1.012 1.005 - 1.030      pH (UA) 6.5 5.0 - 8.0      Protein 30 (A) NEG mg/dL    Glucose NEGATIVE  NEG mg/dL    Ketone NEGATIVE  NEG mg/dL    Bilirubin NEGATIVE  NEG      Blood NEGATIVE  NEG      Urobilinogen 1.0 0.2 - 1.0 EU/dL    Nitrites NEGATIVE  NEG      Leukocyte Esterase NEGATIVE  NEG     ETHYL ALCOHOL    Collection Time: 09/08/19  9:54 AM   Result Value Ref Range    ALCOHOL(ETHYL),SERUM 20 (H) 0 - 3 MG/DL   URINE MICROSCOPIC ONLY    Collection Time: 09/08/19  9:54 AM   Result Value Ref Range    WBC 0 to 1 0 - 4 /hpf    RBC 0 to 1 0 - 5 /hpf    Epithelial cells NEGATIVE  0 - 5 /lpf    Bacteria NEGATIVE  NEG /hpf       Radiologic Studies -   No orders to display         Medical Decision Making   I am the first provider for this patient. I reviewed the vital signs, available nursing notes, past medical history, past surgical history, family history and social history. Vital Signs-Reviewed the patient's vital signs. EKG:    Records Reviewed: Nursing Notes and Old Medical Records (Time of Review: 10:08 AM)    ED Course: Progress Notes, Reevaluation, and Consults:      Provider Notes (Medical Decision Making):   MDM  Number of Diagnoses or Management Options  Abdominal pain, epigastric:   Alcohol use:   Diagnosis management comments: Mild dehydration gastroenteritis versus mild pancreatitis, gastritis    11:30 AM  Pt improved after fluids and meds in ED will dc home labs reviewed        Amount and/or Complexity of Data Reviewed  Clinical lab tests: ordered and reviewed    Risk of Complications, Morbidity, and/or Mortality  Presenting problems: high  Diagnostic procedures: moderate  Management options: moderate            Critical Care Time:       Diagnosis     Clinical Impression:   1. Alcohol use    2.  Abdominal pain, epigastric        Disposition: home     Follow-up Information Follow up With Specialties Details Why [address] [facility] EMERGENCY DEPT Emergency Medicine  As needed, If symptoms worsen [address] Martínez Clay MD Family Practice Schedule an appointment as soon as possible for a visit for ED follow up appointment  [facility] [place] [address]  [place] [address]  [phone]             Patient's Medications   Start Taking    ONDANSETRON (ZOFRAN ODT) 4 MG DISINTEGRATING TABLET    Take 1-2 tablets every 6-8 hours as needed for nausea and vomiting. Continue Taking    CAMPHOR-EUCALYPTUS OIL-MENTHOL (VICKS VAPORUB) 4.8-1.2-2.6 % OINT    1 Actuation(s) by Apply Externally route three (3) times daily as needed for Cough or Other (congestion). DEXTROMETHORPHAN-GUAIFENESIN (CORICIDIN HBP)  MG CAP    Take 1 Cap by mouth two (2) times daily as needed for Cough. DEXTROMETHORPHAN-GUAIFENESIN (ROBITUSSIN-DM)  MG/5 ML SYRUP    Take 10 mL by mouth every six (6) hours as needed for Cough. FLUTICASONE (FLONASE) 50 MCG/ACTUATION NASAL SPRAY    2 Sprays by Both Nostrils route daily. OTHER       These Medications have changed    No medications on file   Stop Taking    No medications on file     _______________________________    Please note that this dictation was completed with [employer], the computer voice recognition software. Quite often unanticipated grammatical, syntax, homophones, and other interpretive errors are inadvertently transcribed by the computer software. Please disregard these errors. Please excuse any errors that have escaped final proofreading.

## 2019-09-08 NOTE — DISCHARGE INSTRUCTIONS

## 2019-09-08 NOTE — ED NOTES
Patient states he is feeling better, less nausea.  Patient given a urinal and hand wipes, call bell at the bedside

## 2019-12-03 ENCOUNTER — HOSPITAL ENCOUNTER (INPATIENT)
Age: 69
LOS: 1 days | Discharge: LEFT BEFORE DISCHARGE | DRG: 282 | End: 2019-12-03
Attending: EMERGENCY MEDICINE | Admitting: HOSPITALIST
Payer: MEDICARE

## 2019-12-03 ENCOUNTER — APPOINTMENT (OUTPATIENT)
Dept: NON INVASIVE DIAGNOSTICS | Age: 69
DRG: 282 | End: 2019-12-03
Attending: PHYSICIAN ASSISTANT
Payer: MEDICARE

## 2019-12-03 ENCOUNTER — APPOINTMENT (OUTPATIENT)
Dept: NUCLEAR MEDICINE | Age: 69
DRG: 282 | End: 2019-12-03
Attending: PHYSICIAN ASSISTANT
Payer: MEDICARE

## 2019-12-03 ENCOUNTER — APPOINTMENT (OUTPATIENT)
Dept: NON INVASIVE DIAGNOSTICS | Age: 69
DRG: 282 | End: 2019-12-03
Attending: HOSPITALIST
Payer: MEDICARE

## 2019-12-03 ENCOUNTER — APPOINTMENT (OUTPATIENT)
Dept: GENERAL RADIOLOGY | Age: 69
DRG: 282 | End: 2019-12-03
Attending: EMERGENCY MEDICINE
Payer: MEDICARE

## 2019-12-03 VITALS
OXYGEN SATURATION: 98 % | HEIGHT: 69 IN | HEART RATE: 65 BPM | RESPIRATION RATE: 18 BRPM | SYSTOLIC BLOOD PRESSURE: 150 MMHG | BODY MASS INDEX: 27.99 KG/M2 | WEIGHT: 189 LBS | DIASTOLIC BLOOD PRESSURE: 91 MMHG | TEMPERATURE: 98.7 F

## 2019-12-03 DIAGNOSIS — I21.4 NSTEMI (NON-ST ELEVATED MYOCARDIAL INFARCTION) (HCC): Primary | ICD-10-CM

## 2019-12-03 LAB
ANION GAP BLD CALC-SCNC: 16 MMOL/L (ref 10–20)
ANION GAP SERPL CALC-SCNC: 5 MMOL/L (ref 3–18)
APTT PPP: 36.7 SEC (ref 23–36.4)
APTT PPP: 59.6 SEC (ref 23–36.4)
ATRIAL RATE: 85 BPM
ATRIAL RATE: 89 BPM
ATRIAL RATE: 96 BPM
AV PEAK GRADIENT: 64.69 MMHG
BASOPHILS # BLD: 0 K/UL (ref 0–0.1)
BASOPHILS NFR BLD: 0 % (ref 0–2)
BUN BLD-MCNC: 15 MG/DL (ref 7–18)
BUN SERPL-MCNC: 15 MG/DL (ref 7–18)
BUN/CREAT SERPL: 13 (ref 12–20)
CA-I BLD-MCNC: 1.13 MMOL/L (ref 1.12–1.32)
CALCIUM SERPL-MCNC: 8.5 MG/DL (ref 8.5–10.1)
CALCULATED P AXIS, ECG09: 51 DEGREES
CALCULATED P AXIS, ECG09: 62 DEGREES
CALCULATED P AXIS, ECG09: 63 DEGREES
CALCULATED R AXIS, ECG10: 66 DEGREES
CALCULATED R AXIS, ECG10: 67 DEGREES
CALCULATED R AXIS, ECG10: 69 DEGREES
CALCULATED T AXIS, ECG11: -84 DEGREES
CALCULATED T AXIS, ECG11: -93 DEGREES
CALCULATED T AXIS, ECG11: -98 DEGREES
CHLORIDE BLD-SCNC: 105 MMOL/L (ref 100–108)
CHLORIDE SERPL-SCNC: 107 MMOL/L (ref 100–111)
CK MB CFR SERPL CALC: 0.8 % (ref 0–4)
CK MB SERPL-MCNC: 1.5 NG/ML (ref 5–25)
CK SERPL-CCNC: 198 U/L (ref 39–308)
CO2 BLD-SCNC: 25 MMOL/L (ref 19–24)
CO2 SERPL-SCNC: 26 MMOL/L (ref 21–32)
CREAT SERPL-MCNC: 1.2 MG/DL (ref 0.6–1.3)
CREAT UR-MCNC: 1.2 MG/DL (ref 0.6–1.3)
DIAGNOSIS, 93000: NORMAL
DIFFERENTIAL METHOD BLD: ABNORMAL
ECHO AO ARCH DIAM: 3.3 CM
ECHO AO ASC DIAM: 3.49 CM
ECHO AO ROOT DIAM: 3.56 CM
ECHO AV REGURGITANT PHT: 771.5 CM
ECHO IVC SNIFF: 2.34 CM
ECHO LA MAJOR AXIS: 3.24 CM
ECHO LA TO AORTIC ROOT RATIO: 0.91
ECHO LV EDV A2C: 146.8 ML
ECHO LV EDV A4C: 182.8 ML
ECHO LV EDV BP: 170.2 ML (ref 67–155)
ECHO LV EDV INDEX A4C: 90.6 ML/M2
ECHO LV EDV INDEX BP: 84.4 ML/M2
ECHO LV EDV NDEX A2C: 72.8 ML/M2
ECHO LV EDV TEICHHOLZ: 0.71 ML
ECHO LV EJECTION FRACTION A2C: 47 %
ECHO LV EJECTION FRACTION A4C: 34 %
ECHO LV EJECTION FRACTION BIPLANE: 40.9 % (ref 55–100)
ECHO LV ESV A2C: 77.5 ML
ECHO LV ESV A4C: 121.5 ML
ECHO LV ESV BP: 100.6 ML (ref 22–58)
ECHO LV ESV INDEX A2C: 38.4 ML/M2
ECHO LV ESV INDEX A4C: 60.3 ML/M2
ECHO LV ESV INDEX BP: 49.9 ML/M2
ECHO LV ESV TEICHHOLZ: 0.51 ML
ECHO LV INTERNAL DIMENSION DIASTOLIC: 5.12 CM (ref 4.2–5.9)
ECHO LV INTERNAL DIMENSION SYSTOLIC: 4.42 CM
ECHO LV IVSD: 1.39 CM (ref 0.6–1)
ECHO LV MASS 2D: 258.6 G (ref 88–224)
ECHO LV MASS INDEX 2D: 128.2 G/M2 (ref 49–115)
ECHO LV POSTERIOR WALL DIASTOLIC: 0.84 CM (ref 0.6–1)
ECHO LVOT DIAM: 2.16 CM
ECHO LVOT PEAK GRADIENT: 3.1 MMHG
ECHO LVOT PEAK VELOCITY: 88.29 CM/S
ECHO LVOT SV: 72.3 ML
ECHO LVOT VTI: 19.73 CM
ECHO MV A VELOCITY: 41.98 CM/S
ECHO MV E DECELERATION TIME (DT): 200.8 MS
ECHO MV E VELOCITY: 80.56 CM/S
ECHO MV E/A RATIO: 1.92
ECHO PULMONARY ARTERY SYSTOLIC PRESSURE (PASP): 44.8 MMHG
ECHO RA MINOR AXIS: 4.31 CM
ECHO TV REGURGITANT MAX VELOCITY: 303.36 CM/S
ECHO TV REGURGITANT PEAK GRADIENT: 36.8 MMHG
EOSINOPHIL # BLD: 0.2 K/UL (ref 0–0.4)
EOSINOPHIL NFR BLD: 3 % (ref 0–5)
ERYTHROCYTE [DISTWIDTH] IN BLOOD BY AUTOMATED COUNT: 14.9 % (ref 11.6–14.5)
GLUCOSE BLD STRIP.AUTO-MCNC: 152 MG/DL (ref 74–106)
GLUCOSE SERPL-MCNC: 147 MG/DL (ref 74–99)
HCT VFR BLD AUTO: 40.6 % (ref 36–48)
HCT VFR BLD CALC: 42 % (ref 36–49)
HGB BLD-MCNC: 13.1 G/DL (ref 13–16)
HGB BLD-MCNC: 14.3 G/DL (ref 12–16)
LVFS 2D: 13.54 %
LVOT MG: 1.6 MMHG
LVOT MV: 0.57 CM/S
LVSV (MOD BI): 34.07 ML
LVSV (MOD SINGLE 4C): 30.05 ML
LVSV (MOD SINGLE): 33.91 ML
LVSV (TEICH): 17.59 ML
LYMPHOCYTES # BLD: 3 K/UL (ref 0.9–3.6)
LYMPHOCYTES NFR BLD: 45 % (ref 21–52)
MCH RBC QN AUTO: 28.9 PG (ref 24–34)
MCHC RBC AUTO-ENTMCNC: 32.3 G/DL (ref 31–37)
MCV RBC AUTO: 89.4 FL (ref 74–97)
MONOCYTES # BLD: 0.4 K/UL (ref 0.05–1.2)
MONOCYTES NFR BLD: 6 % (ref 3–10)
MV DEC SLOPE: 4.01
NEUTS SEG # BLD: 3 K/UL (ref 1.8–8)
NEUTS SEG NFR BLD: 46 % (ref 40–73)
P-R INTERVAL, ECG05: 142 MS
P-R INTERVAL, ECG05: 144 MS
P-R INTERVAL, ECG05: 156 MS
PISA AR MAX VEL: 402.15 CM/S
PLATELET # BLD AUTO: 252 K/UL (ref 135–420)
PMV BLD AUTO: 10.9 FL (ref 9.2–11.8)
POTASSIUM BLD-SCNC: 4 MMOL/L (ref 3.5–5.5)
POTASSIUM SERPL-SCNC: 3.9 MMOL/L (ref 3.5–5.5)
Q-T INTERVAL, ECG07: 342 MS
Q-T INTERVAL, ECG07: 362 MS
Q-T INTERVAL, ECG07: 372 MS
QRS DURATION, ECG06: 104 MS
QRS DURATION, ECG06: 108 MS
QRS DURATION, ECG06: 108 MS
QTC CALCULATION (BEZET), ECG08: 432 MS
QTC CALCULATION (BEZET), ECG08: 440 MS
QTC CALCULATION (BEZET), ECG08: 442 MS
RBC # BLD AUTO: 4.54 M/UL (ref 4.7–5.5)
SODIUM BLD-SCNC: 141 MMOL/L (ref 136–145)
SODIUM SERPL-SCNC: 138 MMOL/L (ref 136–145)
STRESS BASELINE HR: 63 BPM
STRESS ESTIMATED WORKLOAD: 1.5 METS
STRESS EXERCISE DUR MIN: NORMAL
STRESS PEAK DIAS BP: 87 MMHG
STRESS PEAK SYS BP: 190 MMHG
STRESS PERCENT HR ACHIEVED: 72 %
STRESS POST PEAK HR: 109 BPM
STRESS RATE PRESSURE PRODUCT: NORMAL BPM*MMHG
STRESS TARGET HR: 151 BPM
TROPONIN I BLD-MCNC: <0.04 NG/ML (ref 0–0.08)
TROPONIN I SERPL-MCNC: 0.04 NG/ML (ref 0–0.04)
TROPONIN I SERPL-MCNC: 0.04 NG/ML (ref 0–0.04)
TROPONIN I SERPL-MCNC: <0.02 NG/ML (ref 0–0.04)
VENTRICULAR RATE, ECG03: 85 BPM
VENTRICULAR RATE, ECG03: 89 BPM
VENTRICULAR RATE, ECG03: 96 BPM
WBC # BLD AUTO: 6.7 K/UL (ref 4.6–13.2)

## 2019-12-03 PROCEDURE — 74011250637 HC RX REV CODE- 250/637: Performed by: EMERGENCY MEDICINE

## 2019-12-03 PROCEDURE — 77030021352 HC CBL LD SYS DISP COVD -B

## 2019-12-03 PROCEDURE — 80048 BASIC METABOLIC PNL TOTAL CA: CPT

## 2019-12-03 PROCEDURE — 74011250636 HC RX REV CODE- 250/636: Performed by: HOSPITALIST

## 2019-12-03 PROCEDURE — 80047 BASIC METABLC PNL IONIZED CA: CPT

## 2019-12-03 PROCEDURE — 82550 ASSAY OF CK (CPK): CPT

## 2019-12-03 PROCEDURE — 71045 X-RAY EXAM CHEST 1 VIEW: CPT

## 2019-12-03 PROCEDURE — A9500 TC99M SESTAMIBI: HCPCS

## 2019-12-03 PROCEDURE — 85730 THROMBOPLASTIN TIME PARTIAL: CPT

## 2019-12-03 PROCEDURE — 93306 TTE W/DOPPLER COMPLETE: CPT

## 2019-12-03 PROCEDURE — 74011250637 HC RX REV CODE- 250/637: Performed by: HOSPITALIST

## 2019-12-03 PROCEDURE — 84484 ASSAY OF TROPONIN QUANT: CPT

## 2019-12-03 PROCEDURE — 96375 TX/PRO/DX INJ NEW DRUG ADDON: CPT

## 2019-12-03 PROCEDURE — 99285 EMERGENCY DEPT VISIT HI MDM: CPT

## 2019-12-03 PROCEDURE — 93005 ELECTROCARDIOGRAM TRACING: CPT

## 2019-12-03 PROCEDURE — 93017 CV STRESS TEST TRACING ONLY: CPT

## 2019-12-03 PROCEDURE — 74011000250 HC RX REV CODE- 250: Performed by: EMERGENCY MEDICINE

## 2019-12-03 PROCEDURE — 65660000000 HC RM CCU STEPDOWN

## 2019-12-03 PROCEDURE — 96365 THER/PROPH/DIAG IV INF INIT: CPT

## 2019-12-03 PROCEDURE — 85025 COMPLETE CBC W/AUTO DIFF WBC: CPT

## 2019-12-03 PROCEDURE — 96366 THER/PROPH/DIAG IV INF ADDON: CPT

## 2019-12-03 PROCEDURE — 74011250636 HC RX REV CODE- 250/636: Performed by: EMERGENCY MEDICINE

## 2019-12-03 RX ORDER — ASPIRIN 81 MG/1
81 TABLET ORAL DAILY
COMMUNITY

## 2019-12-03 RX ORDER — GUAIFENESIN 100 MG/5ML
81 LIQUID (ML) ORAL DAILY
Status: DISCONTINUED | OUTPATIENT
Start: 2019-12-03 | End: 2019-12-03 | Stop reason: HOSPADM

## 2019-12-03 RX ORDER — NITROGLYCERIN 40 MG/100ML
0-200 INJECTION INTRAVENOUS
Status: DISCONTINUED | OUTPATIENT
Start: 2019-12-03 | End: 2019-12-03

## 2019-12-03 RX ORDER — ATORVASTATIN CALCIUM 40 MG/1
80 TABLET, FILM COATED ORAL
Status: DISCONTINUED | OUTPATIENT
Start: 2019-12-03 | End: 2019-12-03 | Stop reason: HOSPADM

## 2019-12-03 RX ORDER — HEPARIN SODIUM 1000 [USP'U]/ML
4000 INJECTION, SOLUTION INTRAVENOUS; SUBCUTANEOUS ONCE
Status: COMPLETED | OUTPATIENT
Start: 2019-12-03 | End: 2019-12-03

## 2019-12-03 RX ORDER — ACETAMINOPHEN 325 MG/1
650 TABLET ORAL
Status: DISCONTINUED | OUTPATIENT
Start: 2019-12-03 | End: 2019-12-03 | Stop reason: HOSPADM

## 2019-12-03 RX ORDER — ONDANSETRON 2 MG/ML
4 INJECTION INTRAMUSCULAR; INTRAVENOUS
Status: DISCONTINUED | OUTPATIENT
Start: 2019-12-03 | End: 2019-12-03 | Stop reason: HOSPADM

## 2019-12-03 RX ORDER — NITROGLYCERIN 0.4 MG/1
0.4 TABLET SUBLINGUAL AS NEEDED
Status: DISCONTINUED | OUTPATIENT
Start: 2019-12-03 | End: 2019-12-03 | Stop reason: HOSPADM

## 2019-12-03 RX ORDER — HEPARIN SODIUM 1000 [USP'U]/ML
3000 INJECTION, SOLUTION INTRAVENOUS; SUBCUTANEOUS ONCE
Status: COMPLETED | OUTPATIENT
Start: 2019-12-03 | End: 2019-12-03

## 2019-12-03 RX ORDER — HEPARIN SODIUM 10000 [USP'U]/100ML
11.6-25 INJECTION, SOLUTION INTRAVENOUS
Status: DISCONTINUED | OUTPATIENT
Start: 2019-12-03 | End: 2019-12-03 | Stop reason: HOSPADM

## 2019-12-03 RX ORDER — NITROGLYCERIN 0.4 MG/1
0.4 TABLET SUBLINGUAL
Status: COMPLETED | OUTPATIENT
Start: 2019-12-03 | End: 2019-12-03

## 2019-12-03 RX ADMIN — ASPIRIN 81 MG 81 MG: 81 TABLET ORAL at 14:27

## 2019-12-03 RX ADMIN — HEPARIN SODIUM 4000 UNITS: 1000 INJECTION INTRAVENOUS; SUBCUTANEOUS at 03:49

## 2019-12-03 RX ADMIN — NITROGLYCERIN 0.4 MG: 0.4 TABLET SUBLINGUAL at 03:15

## 2019-12-03 RX ADMIN — NITROGLYCERIN 5 MCG/MIN: 40 INJECTION INTRAVENOUS at 03:50

## 2019-12-03 RX ADMIN — HEPARIN SODIUM 3000 UNITS: 1000 INJECTION INTRAVENOUS; SUBCUTANEOUS at 14:24

## 2019-12-03 RX ADMIN — REGADENOSON 0.4 MG: 0.08 INJECTION, SOLUTION INTRAVENOUS at 11:30

## 2019-12-03 RX ADMIN — HEPARIN SODIUM 11.6 UNITS/KG/HR: 10000 INJECTION, SOLUTION INTRAVENOUS at 03:49

## 2019-12-03 NOTE — H&P
Medicine History and Physical    Patient: Dylan Navarror   Age:  71 y.o. Assessment   Principal Problem:    NSTEMI (non-ST elevated myocardial infarction) ([facility] [address] (12/3/2019)          Plan     1) NSTEMI   - Tele monitor   - follow trops   - cards consulted   - NPO now   - asa statin, no bb at this point HR 65   - nitro prn    DISPO  -Pt to be admitted  at this time for reasons addressed above, continued hospitalization for ongoing assessment and treatment indicated     Anticipated Date of Discharge: 1-2 days  Anticipated Disposition (home, SNF) : home    Chief Complaint:   Chief Complaint   Patient presents with    Chest Pain    Shortness of Breath         HPI:   Dylan Navarror is a 71y.o. year old male who presents with  Chest pain/ Shortness of breath. He notes an episode 2 days ago after he ate while he was up and active walking. Yesterday this occurred again after eating. He says sharp chest pain with associated SOB. He came in today mainly because the SOB intensity was getting worse and worse. In ED ekg changes demonstrating LVH. His trop is increasing from <0.02 to 0.04. Patient will be admitted for ACS r/o and cardiology consultation. Review of Systems - positive responses in bold type   Constitutional: Negative for fever, chills, diaphoresis and unexpected weight change. HENT: Negative for ear pain, congestion, sore throat, rhinorrhea, drooling, trouble swallowing, neck pain and tinnitus. Eyes: Negative for photophobia, pain, redness and visual disturbance. Respiratory: negative for shortness of breath, cough, choking, chest tightness, wheezing or stridor. Cardiovascular: Negative for chest pain, palpitations and leg swelling. Gastrointestinal: Negative for nausea, vomiting, abdominal pain, diarrhea, constipation, blood in stool, abdominal distention and anal bleeding. Genitourinary: Negative for dysuria, urgency, frequency, hematuria, flank pain and difficulty urinating. Musculoskeletal: Negative for back pain and arthralgias. Skin: Negative for color change, rash and wound. Neurological: Negative for dizziness, seizures, syncope, speech difficulty, light-headedness or headaches. Hematological: Does not bruise/bleed easily. Psychiatric/Behavioral: Negative for suicidal ideas, hallucinations, behavioral problems, self-injury or agitation       Past Medical History:  Past Medical History:   Diagnosis Date    ETOH abuse     Glaucoma        Past Surgical History:  Past Surgical History:   Procedure Laterality Date    HX HEENT      HX WISDOM TEETH EXTRACTION         Family History:  Family History   Problem Relation Age of Onset    Alcohol abuse Other        Social History:  Social History     Socioeconomic History    Marital status: SINGLE     Spouse name: Not on file    Number of children: Not on file    Years of education: Not on file    Highest education level: Not on file   Tobacco Use    Smoking status: Former Smoker    Smokeless tobacco: Never Used    Tobacco comment: wearing a patch   Substance and Sexual Activity    Alcohol use: Yes     Comment: rarely    Drug use: No       Home Medications:  Prior to Admission medications    Medication Sig Start Date End Date Taking? Authorizing Provider   ondansetron (ZOFRAN ODT) 4 mg disintegrating tablet Take 1-2 tablets every 6-8 hours as needed for nausea and vomiting. 9/8/19   Jennifer Pineda MD   Camphor-Eucalyptus Oil-Menthol (VICKS VAPORUB) 4.8-1.2-2.6 % oint 1 Actuation(s) by Apply Externally route three (3) times daily as needed for Cough or Other (congestion). 3/8/19   Cloyce Folds, PA   dextromethorphan-guaiFENesin (ROBITUSSIN-DM)  mg/5 mL syrup Take 10 mL by mouth every six (6) hours as needed for Cough. 3/8/19   Army Tomy Wilcox PA   fluticasone (FLONASE) 50 mcg/actuation nasal spray 2 Sprays by Both Nostrils route daily.  3/8/19   Cloyce LAWANDA Kerr   dextromethorphan-guaiFENesin (CORICIDIN HBP)  mg cap Take 1 Cap by mouth two (2) times daily as needed for Cough. 3/8/19   LAWANDA Nogueira   OTHER     Other, MD Yogesh       Allergies:  No Known Allergies        Physical Exam:     Visit Vitals  /63   Pulse 64   Temp 98.7 °F (37.1 °C)   Resp 23   Ht 5' 9\" (1.753 m)   Wt 85.7 kg (189 lb)   SpO2 98%   BMI 27.91 kg/m²       Physical Exam:  General appearance: alert, cooperative, no distress, appears stated age  Head: Normocephalic, without obvious abnormality, atraumatic  Neck: supple, trachea midline  Lungs: clear to auscultation bilaterally  Heart: regular rate and rhythm, S1, S2 normal, no murmur, click, rub or gallop  Abdomen: soft, non-tender. Bowel sounds normal. No masses,  no organomegaly  Extremities: extremities normal, atraumatic, no cyanosis or edema  Skin: Skin color, texture, turgor normal. No rashes or lesions  Neurologic: Grossly normal    Intake and Output:  Current Shift:  No intake/output data recorded. Last three shifts:  No intake/output data recorded.     Lab/Data Reviewed:  CMP:   Lab Results   Component Value Date/Time     12/03/2019 03:11 AM    K 3.9 12/03/2019 03:11 AM     12/03/2019 03:11 AM    CO2 26 12/03/2019 03:11 AM    AGAP 5 12/03/2019 03:11 AM     (H) 12/03/2019 03:11 AM    BUN 15 12/03/2019 03:11 AM    CREA 1.20 12/03/2019 03:11 AM    GFRAA >60 12/03/2019 03:11 AM    GFRNA >60 12/03/2019 03:11 AM    CA 8.5 12/03/2019 03:11 AM     CBC:   Lab Results   Component Value Date/Time    WBC 6.7 12/03/2019 03:11 AM    HGB 13.1 12/03/2019 03:11 AM    HCT 40.6 12/03/2019 03:11 AM     12/03/2019 03:11 AM     All Cardiac Markers in the last 24 hours:   Lab Results   Component Value Date/Time     12/03/2019 03:11 AM    CKMB 1.5 12/03/2019 03:11 AM    CKND1 0.8 12/03/2019 03:11 AM    TROIQ 0.04 12/03/2019 04:57 AM    TROIQ <0.02 12/03/2019 03:11 AM    TNIPOC <0.04 12/03/2019 03:08 AM       Tucker Munoz MD    December 3, 2019

## 2019-12-03 NOTE — ROUTINE PROCESS
1500 Patient wants to go home against medical advise. Patient verbalized understanding of AMA and risks involved. AMA paper signed

## 2019-12-03 NOTE — ED TRIAGE NOTES
Pt [id number] reports chest pain that woke him from sleep 10/10, Pt reports the pain radiates to his back. Pt reports SOB is cool clammy and diaphoretic.  PT denies HX of MI.

## 2019-12-03 NOTE — ED PROVIDER NOTES
EMERGENCY DEPARTMENT HISTORY AND PHYSICAL EXAM    4:59 AM      Date: 12/3/2019  Patient Name: Gaby Contreras    History of Presenting Illness     Chief Complaint   Patient presents with    Chest Pain    Shortness of Breath         History Provided By: Patient    Additional History (Context): Gaby Contreras is a 71 y.o. male with no significant past medical history who presents with complaint of chest pain which started just prior to arrival.  The patient states it is severe, pressure-like, like something sitting on his chest.  He does have some associated shortness of breath, but denies diaphoresis, nausea, vomiting or other associated symptoms. Patient states he was sitting and watching TV when this occurred. He was given aspirin by EMS prior to arrival.    PCP: Shireen Sinclair MD    Current Facility-Administered Medications   Medication Dose Route Frequency Provider Last Rate Last Dose    heparin 25,000 units in D5W 250 ml infusion  11.6-25 Units/kg/hr IntraVENous TITRATE Quiana Cleaning MD 9.9 mL/hr at 12/03/19 0349 11.6 Units/kg/hr at 12/03/19 0349    nitroglycerin (TRIDIL) 400 mcg/ml infusion  0-200 mcg/min IntraVENous TITRATE Quiana Cleaning MD   Stopped at 12/03/19 0530     Current Outpatient Medications   Medication Sig Dispense Refill    ondansetron (ZOFRAN ODT) 4 mg disintegrating tablet Take 1-2 tablets every 6-8 hours as needed for nausea and vomiting. 10 Tab 0    Camphor-Eucalyptus Oil-Menthol (VICKS VAPORUB) 4.8-1.2-2.6 % oint 1 Actuation(s) by Apply Externally route three (3) times daily as needed for Cough or Other (congestion). 50 g 0    dextromethorphan-guaiFENesin (ROBITUSSIN-DM)  mg/5 mL syrup Take 10 mL by mouth every six (6) hours as needed for Cough. 240 mL 0    fluticasone (FLONASE) 50 mcg/actuation nasal spray 2 Sprays by Both Nostrils route daily.  1 Bottle 0    dextromethorphan-guaiFENesin (CORICIDIN HBP)  mg cap Take 1 Cap by mouth two (2) times daily as needed for Cough. 20 Cap 0    OTHER          Past History     Past Medical History:  Past Medical History:   Diagnosis Date    ETOH abuse     Glaucoma        Past Surgical History:  Past Surgical History:   Procedure Laterality Date    HX HEENT      HX WISDOM TEETH EXTRACTION         Family History:  Family History   Problem Relation Age of Onset    Alcohol abuse Other        Social History:  Social History     Tobacco Use    Smoking status: Former Smoker    Smokeless tobacco: Never Used    Tobacco comment: wearing a patch   Substance Use Topics    Alcohol use: Yes     Comment: rarely    Drug use: No       Allergies:  No Known Allergies      Review of Systems       Review of Systems   Constitutional: Negative for activity change and appetite change. HENT: Negative for congestion. Eyes: Negative for visual disturbance. Respiratory: Positive for shortness of breath. Negative for cough. Cardiovascular: Positive for chest pain. Gastrointestinal: Negative for abdominal pain, diarrhea, nausea and vomiting. Genitourinary: Negative for dysuria. Musculoskeletal: Negative for arthralgias and myalgias. Skin: Negative for rash. Neurological: Negative for weakness and numbness. Physical Exam     Visit Vitals  /69   Pulse 65   Temp 98.7 °F (37.1 °C)   Resp 20   Ht 5' 9\" (1.753 m)   Wt 85.7 kg (189 lb)   SpO2 99%   BMI 27.91 kg/m²         Physical Exam  Vitals signs and nursing note reviewed. Constitutional:       Appearance: He is well-developed. Comments: Appears uncomfortable   HENT:      Head: Normocephalic and atraumatic. Eyes:      Conjunctiva/sclera: Conjunctivae normal.   Neck:      Musculoskeletal: Normal range of motion and neck supple. Vascular: No JVD. Cardiovascular:      Rate and Rhythm: Normal rate and regular rhythm. Heart sounds: Normal heart sounds. No murmur. Pulmonary:      Effort: Pulmonary effort is normal.      Breath sounds: Normal breath sounds. Abdominal:      General: Bowel sounds are normal. There is no distension. Palpations: Abdomen is soft. Tenderness: There is no tenderness. Musculoskeletal: Normal range of motion. General: No deformity. Lymphadenopathy:      Cervical: No cervical adenopathy. Skin:     General: Skin is warm and dry. Findings: No rash. Neurological:      Mental Status: He is alert and oriented to person, place, and time. Coordination: Coordination normal.           Diagnostic Study Results     Labs -  Recent Results (from the past 12 hour(s))   EKG, 12 LEAD, INITIAL    Collection Time: 12/03/19  3:00 AM   Result Value Ref Range    Ventricular Rate 96 BPM    Atrial Rate 96 BPM    P-R Interval 156 ms    QRS Duration 104 ms    Q-T Interval 342 ms    QTC Calculation (Bezet) 432 ms    Calculated P Axis 62 degrees    Calculated R Axis 67 degrees    Calculated T Axis -93 degrees    Diagnosis        Critical Test Result: STEMI  Sinus rhythm with frequent premature ventricular complexes and fusion   complexes  Left ventricular hypertrophy with repolarization abnormality ( Sokolow-Vega ,   Romhilt-Sullivan )  ST elevation, consider anterior injury or acute infarct  ** ** ACUTE MI / STEMI ** **  Abnormal ECG  When compared with ECG of 29-AUG-2018 06:35,  fusion complexes are now present  Vent.  rate has increased BY  32 BPM  ST more depressed in Inferior leads  T wave inversion more evident in Inferior leads     POC TROPONIN-I    Collection Time: 12/03/19  3:08 AM   Result Value Ref Range    Troponin-I (POC) <0.04 0.00 - 0.08 ng/mL   CBC WITH AUTOMATED DIFF    Collection Time: 12/03/19  3:11 AM   Result Value Ref Range    WBC 6.7 4.6 - 13.2 K/uL    RBC 4.54 (L) 4.70 - 5.50 M/uL    HGB 13.1 13.0 - 16.0 g/dL    HCT 40.6 36.0 - 48.0 %    MCV 89.4 74.0 - 97.0 FL    MCH 28.9 24.0 - 34.0 PG    MCHC 32.3 31.0 - 37.0 g/dL    RDW 14.9 (H) 11.6 - 14.5 %    PLATELET 444 308 - 517 K/uL    MPV 10.9 9.2 - 11.8 FL NEUTROPHILS 46 40 - 73 %    LYMPHOCYTES 45 21 - 52 %    MONOCYTES 6 3 - 10 %    EOSINOPHILS 3 0 - 5 %    BASOPHILS 0 0 - 2 %    ABS. NEUTROPHILS 3.0 1.8 - 8.0 K/UL    ABS. LYMPHOCYTES 3.0 0.9 - 3.6 K/UL    ABS. MONOCYTES 0.4 0.05 - 1.2 K/UL    ABS. EOSINOPHILS 0.2 0.0 - 0.4 K/UL    ABS. BASOPHILS 0.0 0.0 - 0.1 K/UL    DF AUTOMATED     METABOLIC PANEL, BASIC    Collection Time: 12/03/19  3:11 AM   Result Value Ref Range    Sodium 138 136 - 145 mmol/L    Potassium 3.9 3.5 - 5.5 mmol/L    Chloride 107 100 - 111 mmol/L    CO2 26 21 - 32 mmol/L    Anion gap 5 3.0 - 18 mmol/L    Glucose 147 (H) 74 - 99 mg/dL    BUN 15 7.0 - 18 MG/DL    Creatinine 1.20 0.6 - 1.3 MG/DL    BUN/Creatinine ratio 13 12 - 20      GFR est AA >60 >60 ml/min/1.73m2    GFR est non-AA >60 >60 ml/min/1.73m2    Calcium 8.5 8.5 - 10.1 MG/DL   CARDIAC PANEL,(CK, CKMB & TROPONIN)    Collection Time: 12/03/19  3:11 AM   Result Value Ref Range     39 - 308 U/L    CK - MB 1.5 <3.6 ng/ml    CK-MB Index 0.8 0.0 - 4.0 %    Troponin-I, QT <0.02 0.0 - 0.045 NG/ML   POC CHEM8    Collection Time: 12/03/19  3:11 AM   Result Value Ref Range    CO2, POC 25 (H) 19 - 24 MMOL/L    Glucose,  (H) 74 - 106 MG/DL    BUN, POC 15 7 - 18 MG/DL    Creatinine, POC 1.2 0.6 - 1.3 MG/DL    GFRAA, POC >60 >60 ml/min/1.73m2    GFRNA, POC >60 >60 ml/min/1.73m2    Sodium,  136 - 145 MMOL/L    Potassium, POC 4.0 3.5 - 5.5 MMOL/L    Calcium, ionized (POC) 1.13 1.12 - 1.32 mmol/L    Chloride,  100 - 108 MMOL/L    Anion gap, POC 16 10 - 20      Hematocrit, POC 42 36 - 49 %    Hemoglobin, POC 14.3 12 - 16 G/DL   PTT    Collection Time: 12/03/19  3:11 AM   Result Value Ref Range    aPTT 36.7 (H) 23.0 - 36.4 SEC       Radiologic Studies -   XR CHEST PORT    (Results Pending)         Medical Decision Making   I am the first provider for this patient.     I reviewed the vital signs, available nursing notes, past medical history, past surgical history, family history and social history. Vital Signs-Reviewed the patient's vital signs. EKG:  Normal sinus rhythm, rate 96, , QTc 432. Occasional PVCs. Some elevation in leads V2 and V3, with T wave inversions and depressions in leads II, 3, aVF. Possibly most consistent with left ventricular hypertrophy. No acute ST or T wave changes, no STEMI. Records Reviewed: Nursing Notes, Old Medical Records and Previous electrocardiograms (Time of Review: 4:59 AM)      Provider Notes (Medical Decision Making):   Jen Aranda is a 71 y.o. male with no significant past medical history who presents with complaint of chest pain which started just prior to arrival.  The patient states it is severe, pressure-like, like something sitting on his chest.  He does have some associated shortness of breath, but denies diaphoresis, nausea, vomiting or other associated symptoms. Patient states he was sitting and watching TV when this occurred. He was given aspirin by EMS prior to arrival.  EKG was some concerning changes, possible left ventricular hypertrophy, but read as possible STEMI. I did discuss the case over the phone with cardiology who felt this was consistent with left ventricular hypertrophy and did not want the Cath Lab called. Recommended nitroglycerin drip, heparin and repeat troponin. Differential Diagnosis: Concern for non-STEMI, would consider other causes of chest pain. Will consider GERD, Pericarditis/myocarditis, pleuritis, bronchitis or other respiratory infection, pneumothorax, pneumonia, MSK pain. Given the patient's history and exam, I do not suspect PE, aortic dissection, pancreatitis, esophageal rupture, pericardial tamponate, mediastenitis. Testing: CXR, ECG, CBC, CMP, Troponin. Treatments: Nitroglycerin drip, heparin    Re-evaluations:  Pain much better managed on the nitroglycerin drip. Nitroglycerin drip was weaned off and patient remains asymptomatic. Peak troponin was 0.04.   Patient to be admitted to hospitalist service for further evaluation. Critical Care Time: Critical Care Time:  The services I provided to this patient were to treat and/or prevent clinically significant deterioration that could result in the failure of one or more body systems and/or organ systems due to NSTEMI. Services included the following:  -reviewing nursing notes and old charts  -vital sign assessments  -direct patient care  -medication orders and management  -interpreting and reviewing diagnostic studies/labs  -re-evaluations  -documentation time    Aggregate critical care time was 35 minutes, which includes only time during which I was engaged in work directly related to the patient's care as described above, whether I was at bedside or elsewhere in the Emergency Department. It did not include time spent performing other reported procedures or the services of residents, students, nurses, or advance practice providers. Marti Kwon MD    6:10 AM      Diagnosis     Clinical Impression:   1. NSTEMI (non-ST elevated myocardial infarction) ([facility] [address]        Disposition: Discharge    Follow-up Information    None          Patient's Medications   Start Taking    No medications on file   Continue Taking    CAMPHOR-EUCALYPTUS OIL-MENTHOL (VICKS VAPORUB) 4.8-1.2-2.6 % OINT    1 Actuation(s) by Apply Externally route three (3) times daily as needed for Cough or Other (congestion). DEXTROMETHORPHAN-GUAIFENESIN (CORICIDIN HBP)  MG CAP    Take 1 Cap by mouth two (2) times daily as needed for Cough. DEXTROMETHORPHAN-GUAIFENESIN (ROBITUSSIN-DM)  MG/5 ML SYRUP    Take 10 mL by mouth every six (6) hours as needed for Cough. FLUTICASONE (FLONASE) 50 MCG/ACTUATION NASAL SPRAY    2 Sprays by Both Nostrils route daily. ONDANSETRON (ZOFRAN ODT) 4 MG DISINTEGRATING TABLET    Take 1-2 tablets every 6-8 hours as needed for nausea and vomiting.     OTHER       These Medications have changed    No medications on file   Stop Taking    No medications on file     _______________________________    Attestations:  Nikki Horton MD acting as a scribe for and in the presence of Lady Sharon MD      December 03, 2019 at [facility] [[email or web address]] ([facility]) AM       Provider Attestation:      I personally performed the services described in the documentation, reviewed the documentation, as recorded by the scribe in my presence, and it accurately and completely records my words and actions.  December 03, 2019 at 6:10 AM - Lady Sharon MD    _______________________________

## 2019-12-03 NOTE — ED NOTES
TRANSFER - ED to INPATIENT REPORT:    SBAR report made available to receiving floor on this patient being transferred to [address] [place] [address] ([facility])  for routine progression of care       Admitting diagnosis NSTEMI (non-ST elevated myocardial infarction) ([facility] [address] [I21.4]    Information from the following report(s) SBAR was made available to receiving floor. Lines:   Peripheral IV 12/03/19 Left Antecubital (Active)   Site Assessment Clean, dry, & intact 12/3/2019  3:15 AM   Phlebitis Assessment 0 12/3/2019  3:15 AM   Infiltration Assessment 0 12/3/2019  3:15 AM   Dressing Status Clean, dry, & intact 12/3/2019  3:15 AM   Dressing Type Transparent 12/3/2019  3:15 AM   Hub Color/Line Status Pink 12/3/2019  3:15 AM       Peripheral IV 12/03/19 Right Antecubital (Active)   Site Assessment Clean, dry, & intact 12/3/2019  3:16 AM   Phlebitis Assessment 0 12/3/2019  3:16 AM   Infiltration Assessment 0 12/3/2019  3:16 AM   Dressing Status Clean, dry, & intact 12/3/2019  3:16 AM   Dressing Type Transparent 12/3/2019  3:16 AM   Hub Color/Line Status Green 12/3/2019  3:16 AM   Action Taken Blood drawn 12/3/2019  3:16 AM            Opportunity for questions and clarification was provided.       Patient is oriented to time, place, person and situation   Patient is  continent and ambulatory without assist     Valuables transported with patient     Patient transported with:   Monitor    MAP (Monitor): 106 =Monitored (most recent)  Vitals w/ MEWS Score (last day)     Date/Time MEWS Score Pulse Resp Temp BP Level of Consciousness SpO2    12/03/19 08:08:39  1  65  18  98.7 °F (37.1 °C)  (!) 150/91  Alert  98 %    12/03/19 0745    65  19    (!) 150/91    98 %    12/03/19 0730    63  21    137/82    96 %    12/03/19 0715    62  22    136/85    97 %    12/03/19 0700    75  20    161/74    98 %    12/03/19 0645    (!) 59  20    140/74    99 %    12/03/19 0615    64  23    128/63    98 %    12/03/19 0600    77  23    150/69    98 % 12/03/19 0545    62  22    141/72    98 %    12/03/19 0530    65  20    134/69    99 %    12/03/19 0515    64  22    142/77    100 %    12/03/19 0500    64  22    146/73    98 %    12/03/19 0445    68  23    150/83    100 %    12/03/19 0430    71  24    158/74    99 %    12/03/19 0415    69  24    142/86    100 %    12/03/19 0350          149/75        12/03/19 0345    78  24    160/82    99 %    12/03/19 0330    81  17    149/75    99 %    12/03/19 0315  2  86  25  98.7 °F (37.1 °C)  164/87  Alert  97 %    12/03/19 0305    97  20    (!) 183/105  Alert  100 %

## 2019-12-03 NOTE — DISCHARGE SUMMARY
Discharge Summary (AMA)    Patient: Nguyen Carbajal               Sex: male          DOA: 12/3/2019       YOB: 1950      Age:  71 y.o.        LOS:  LOS: 0 days                Admit Date: 12/3/2019    Discharge Date: 12/3/2019    Admission Diagnoses: NSTEMI (non-ST elevated myocardial infarction) [facility]) [I21.4]    Discharge Diagnoses:    Hospital Problems  Never Reviewed          Codes Class Noted POA    * (Principal) NSTEMI (non-ST elevated myocardial infarction) [facility]) ICD-10-CM: I21.4  ICD-9-CM: 410.70  12/3/2019 Unknown              Discharge Disposition:  Against Medical Advice    Discharge Condition:  [address] Course:  70M w/ PMH who presented with chest pressure and SOB that began 1 day PTA. Troponin minimally elevated . 02-->.04. EKG with LVH and inferior ST-T wave depression. Cardiology was consulted. He underwent nuclear stress test which was abnormal (see full result below). Echo revealed EF 36-40%. Cardiology strongly recommended to pt to stay for cardiac catheterization, to assess etiology of fixed defect and reduced EF, however pt declined to stay. He states he will get the advice of his primary care physician before proceeding further. He was informed of the risks of not completing his cardiac workup working including risk of MI and death, he expressed understanding of risks. He was strongly encouraged to f/u with PCP as soon as possible. Nuclear Cardiac Stress Test 12/3/19  · Baseline ECG: Normal EKG, non-specific ST-T wave abnormalities, LVH with repolarization abnormality (strain). · Gated SPECT: Left ventricular function post-stress was abnormal. Calculated ejection fraction is 43%. · Left ventricular perfusion is equivocal.  · Myocardial perfusion imaging defect 1: There is a defect with a moderate reduction in uptake present in the basal-apical location(s) that is predominantly fixed. The defect appears to probably be artifact caused by subdiaphragmatic activity. TTE 12/3/19  Result status: Final result   · Left Ventricle: Normal cavity size and diastolic function. Moderate concentric hypertrophy. Moderate systolic dysfunction. Estimated left ventricular ejection fraction is 36 - 40%. Visually measured ejection fraction. Left ventricular global hypokinesis. · Right Ventricle: Dilated right ventricle. · Aortic Valve: Aortic valve leaflet calcification present without reduced excursion. Moderate aortic valve regurgitation is present. · Mitral Valve: Mild mitral valve regurgitation is present. · Pulmonary Artery: Mild to moderate pulmonary hypertension. · Tricuspid Valve: Mild tricuspid valve regurgitation is present. · IVC/Hepatic Veins: Moderately elevated central venous pressure (10-15 mmHg); IVC diameter is larger than 21 mm and collapses more than 50% with respiration. · Pulmonary arterial systolic pressure is 09.6 mmHg. Mild to moderate pulmonary hypertension. Consults:    Cardiology    Labs:  Labs: Results:       Chemistry Recent Labs     12/03/19 0311   *      K 3.9      CO2 26   BUN 15   CREA 1.20   CA 8.5   AGAP 5   BUCR 13      CBC w/Diff Recent Labs     12/03/19 0311   WBC 6.7   RBC 4.54*   HGB 13.1   HCT 40.6      GRANS 46   LYMPH 45   EOS 3      Cardiac Enzymes Recent Labs     12/03/19  0311      CKND1 0.8      Coagulation Recent Labs     12/03/19  1010 12/03/19 0311   APTT 59.6* 36.7*       Lipid Panel Lab Results   Component Value Date/Time    Cholesterol, total 191 01/22/2013 10:03 AM    HDL Cholesterol 55 01/22/2013 10:03 AM    LDL, calculated 115.8 01/22/2013 10:03 AM    VLDL, calculated 20.2 01/22/2013 10:03 AM    Triglyceride 101 01/22/2013 10:03 AM    CHOL/HDL Ratio 3.5 01/22/2013 10:03 AM      BNP No results for input(s): BNPP in the last 72 hours. Liver Enzymes No results for input(s): TP, ALB, TBIL, AP, SGOT, GPT in the last 72 hours.     No lab exists for component: DBIL   Thyroid Studies Lab Results   Component Value Date/Time    TSH 1.87 05/12/2014 04:30 AM            Significant Diagnostic Studies:   Xr Chest Port    Result Date: 12/3/2019  PORTABLE CHEST Indication: Chest pain and shortness of breath Comparison study from 3/8/2019. Monitoring leads are superimposed over the chest.New interstitial infiltrate involving most of the left lung. The right lung is free of acute abnormality. Again noted is curvilinear scarring at the right base. Chronic right pleural thickening. No pleural effusion and no pneumothorax. ---------------------    Impression: New interstitial infiltrate involving the left lung. The differential diagnosis includes interstitial pneumonitis versus asymmetric pulmonary edema. Chronic right basilar parenchymal scarring and right pleural thickening, unchanged.            Total time spent including time spent on final examination and discharge discussion, discharge documentation and records reviewed and medication reconciliation: > 30 minutes    Chu Espino MD  [facility]

## 2019-12-03 NOTE — CONSULTS
[facility] - Consult Note    Date of  Admission: 12/3/2019  3:14 AM   Primary Care Physician:  Bridger Self MD  Patient seen and examined independently. Patient developed several episodes of chest discomfort on the day of admission. Minimal elevation of troponin. Letter cardiogram remarkable for LVH with secondary ST and T wave abnormalities. Patient has no  known history of hypertension. Will order nuclear stress test.  We will also check echocardiogram.  Agree with assessment and plan as noted below. Milady Douglas MD   Assessment:     Patient Active Problem List   Diagnosis Code    Alcohol withdrawal ([facility] F10.239    Suicide ideation R45.851    NSTEMI (non-ST elevated myocardial infarction) ([facility] I21.4       - Chest pain, Troponin . 02-->.04. EKG with LVH and inferior ST-T wave depression  - Elevated blood pressure without diagnosis of hypertension     Plan:     - Patient presenting with chest pain, will order nuclear stress test for further risk stratification  - Echocardiogram this admission  - ASA, statin ordered, further adjustment of medications and workup pending results of nuclear stress test      History of Present Illness: This is a 71 y.o. male admitted for NSTEMI (non-ST elevated myocardial infarction) ([facility] [I21.4]. Patient complains of:  Chest pain     This is a 71year old male with no significant past medical history that cardiology is seeing in consult due to chest pain, mildly elevated Troponin. He explains that yesterday, he felt shortness of breath while tying his shoes. He later began to feel chest pressure and shortness of breath, he was lying down while the chest pressure occurred. He tried to turn to his side with no relief of the pain. He was diaphoretic, turned the air conditioner on to try and relieve his symptoms.  He continued to have waxing and waning chest pain throughout the day, he walked to the grocery store and actually had some relief of the pain. Denies personal history of CAD, CHF, diabetes, hypertension or dyslipidemia. He sees the doctor regularly for physicals. Denies family history of CAD. Cardiac risk factors: male gender      Review of Symptoms:  Except as stated above include:  Constitutional:  negative  Respiratory:  negative  Cardiovascular:  Per HPI  Gastrointestinal: negative  Genitourinary:  negative  Musculoskeletal:  Negative  Neurological:  Negative  Dermatological:  Negative  Endocrinological: Negative  Psychological:  Negative    A comprehensive review of systems was negative except for that written in the HPI.      Past Medical History:     Past Medical History:   Diagnosis Date    ETOH abuse     Glaucoma          Social History:     Social History     Socioeconomic History    Marital status: SINGLE     Spouse name: Not on file    Number of children: Not on file    Years of education: Not on file    Highest education level: Not on file   Tobacco Use    Smoking status: Former Smoker    Smokeless tobacco: Never Used    Tobacco comment: wearing a patch   Substance and Sexual Activity    Alcohol use: Yes     Comment: rarely    Drug use: No        Family History:     Family History   Problem Relation Age of Onset    Alcohol abuse Other         Medications:   No Known Allergies     Current Facility-Administered Medications   Medication Dose Route Frequency    heparin 25,000 units in D5W 250 ml infusion  11.6-25 Units/kg/hr IntraVENous TITRATE    aspirin chewable tablet 81 mg  81 mg Oral DAILY    atorvastatin (LIPITOR) tablet 80 mg  80 mg Oral QHS    acetaminophen (TYLENOL) tablet 650 mg  650 mg Oral Q4H PRN    ondansetron (ZOFRAN) injection 4 mg  4 mg IntraVENous Q4H PRN    nitroglycerin (NITROSTAT) tablet 0.4 mg  0.4 mg SubLINGual PRN    technetium sestamibi (CARDIOLITE) injection 03.9 millicurie  34.6 millicurie IntraVENous RAD ONCE    regadenoson (LEXISCAN) injection 0.4 mg  0.4 mg IntraVENous RAD ONCE Physical Exam:     Visit Vitals  BP (!) 150/91   Pulse 65   Temp 98.7 °F (37.1 °C)   Resp 18   Ht 5' 9\" (1.753 m)   Wt 189 lb (85.7 kg)   SpO2 98%   BMI 27.91 kg/m²     BP Readings from Last 3 Encounters:   12/03/19 (!) 150/91   09/08/19 170/88   03/08/19 175/86     Pulse Readings from Last 3 Encounters:   12/03/19 65   09/08/19 88   03/08/19 82     Wt Readings from Last 3 Encounters:   12/03/19 189 lb (85.7 kg)   09/08/19 175 lb (79.4 kg)   03/08/19 170 lb (77.1 kg)       General:  alert, cooperative, no distress  Neck:  nontender, no JVD  Lungs:  clear to auscultation bilaterally  Heart:  regular rate and rhythm, S1, S2 normal, no murmur, click, rub or gallop  Abdomen:  abdomen is soft without significant tenderness, masses, organomegaly or guarding  Extremities:  extremities normal, atraumatic, no cyanosis or edema  Skin: Warm and dry. no hyperpigmentation, vitiligo, or suspicious lesions  Neuro: alert, oriented x3, affect appropriate  Psych: non focal     Data Review:     Recent Labs     12/03/19  0311   WBC 6.7   HGB 13.1   HCT 40.6        Recent Labs     12/03/19  0311      K 3.9      CO2 26   *   BUN 15   CREA 1.20   CA 8.5       Results for orders placed or performed during the hospital encounter of 12/03/19   EKG, 12 LEAD, INITIAL   Result Value Ref Range    Ventricular Rate 96 BPM    Atrial Rate 96 BPM    P-R Interval 156 ms    QRS Duration 104 ms    Q-T Interval 342 ms    QTC Calculation (Bezet) 432 ms    Calculated P Axis 62 degrees    Calculated R Axis 67 degrees    Calculated T Axis -93 degrees    Diagnosis         Abnormal ECG  When compared with ECG of 29-AUG-2018 06:35,  fusion complexes are now present  Vent.  rate has increased BY  32 BPM  ST more depressed in Inferior leads  T wave inversion more evident in Inferior leads         All Cardiac Markers in the last 24 hours:    Lab Results   Component Value Date/Time     12/03/2019 03:11 AM    CKMB 1.5 12/03/2019 03:11 AM    CKND1 0.8 12/03/2019 03:11 AM    TROIQ 0.04 12/03/2019 04:57 AM    TROIQ <0.02 12/03/2019 03:11 AM    TNIPOC <0.04 12/03/2019 03:08 AM       Last Lipid:    Lab Results   Component Value Date/Time    Cholesterol, total 191 01/22/2013 10:03 AM    HDL Cholesterol 55 01/22/2013 10:03 AM    LDL, calculated 115.8 01/22/2013 10:03 AM    Triglyceride 101 01/22/2013 10:03 AM    CHOL/HDL Ratio 3.5 01/22/2013 10:03 AM       Signed By: Kaela Herron.  Rasheeda Uribe PAGAMA     December 3, 2019

## 2019-12-03 NOTE — PROGRESS NOTES
Patient with abnormal nuclear stress test and echo, \"there is a defect with a moderate reduction in uptake present in the basal-apical location(s) that is predominantly fixed. \" EF 36-40% on echo, LV global hypokinesis. I informed patient of findings and discussed with him that the next recommended step is cardiac cath, to assess etiology of fixed defect and reduced EF. Patient wants to leave AMA, I strongly advised against that decision and explained risk and MI and death without completion of cardiac workup, he understood. Discussed with Dr. Elvia Ho as well. He can follow up in our office if he agrees to, [phone].     Edel Ga PA-C

## 2020-03-16 ENCOUNTER — HOSPITAL ENCOUNTER (EMERGENCY)
Age: 70
Discharge: HOME | End: 2020-03-16
Attending: EMERGENCY MEDICINE
Payer: MEDICARE

## 2020-03-16 ENCOUNTER — APPOINTMENT (OUTPATIENT)
Dept: GENERAL RADIOLOGY | Age: 70
End: 2020-03-16
Attending: EMERGENCY MEDICINE
Payer: MEDICARE

## 2020-03-16 VITALS
RESPIRATION RATE: 20 BRPM | TEMPERATURE: 98.7 F | DIASTOLIC BLOOD PRESSURE: 87 MMHG | HEART RATE: 58 BPM | OXYGEN SATURATION: 100 % | WEIGHT: 170 LBS | BODY MASS INDEX: 24.34 KG/M2 | HEIGHT: 70 IN | SYSTOLIC BLOOD PRESSURE: 165 MMHG

## 2020-03-16 DIAGNOSIS — R07.9 LEFT-SIDED CHEST PAIN: ICD-10-CM

## 2020-03-16 DIAGNOSIS — S20.212A RIB CONTUSION, LEFT, INITIAL ENCOUNTER: Primary | ICD-10-CM

## 2020-03-16 PROCEDURE — 71045 X-RAY EXAM CHEST 1 VIEW: CPT

## 2020-03-16 PROCEDURE — 74011250637 HC RX REV CODE- 250/637: Performed by: EMERGENCY MEDICINE

## 2020-03-16 PROCEDURE — 99283 EMERGENCY DEPT VISIT LOW MDM: CPT

## 2020-03-16 PROCEDURE — 74011000250 HC RX REV CODE- 250: Performed by: EMERGENCY MEDICINE

## 2020-03-16 PROCEDURE — 77030027138 HC INCENT SPIROMETER -A

## 2020-03-16 RX ORDER — ACETAMINOPHEN 500 MG
1000 TABLET ORAL
Status: COMPLETED | OUTPATIENT
Start: 2020-03-16 | End: 2020-03-16

## 2020-03-16 RX ORDER — LIDOCAINE 4 G/100G
1 PATCH TOPICAL EVERY 24 HOURS
Status: DISCONTINUED | OUTPATIENT
Start: 2020-03-16 | End: 2020-03-16 | Stop reason: HOSPADM

## 2020-03-16 RX ORDER — LIDOCAINE 50 MG/G
PATCH TOPICAL
Qty: 30 EACH | Refills: 0 | Status: SHIPPED | OUTPATIENT
Start: 2020-03-16 | End: 2020-05-04 | Stop reason: ALTCHOICE

## 2020-03-16 RX ORDER — ACETAMINOPHEN 325 MG/1
650 TABLET ORAL
Qty: 20 TAB | Refills: 0 | Status: SHIPPED | OUTPATIENT
Start: 2020-03-16 | End: 2020-03-19

## 2020-03-16 RX ORDER — IBUPROFEN 600 MG/1
600 TABLET ORAL
Qty: 20 TAB | Refills: 0 | Status: SHIPPED | OUTPATIENT
Start: 2020-03-16 | End: 2020-05-04 | Stop reason: ALTCHOICE

## 2020-03-16 RX ORDER — IBUPROFEN 600 MG/1
600 TABLET ORAL
Status: COMPLETED | OUTPATIENT
Start: 2020-03-16 | End: 2020-03-16

## 2020-03-16 RX ADMIN — IBUPROFEN 600 MG: 600 TABLET, FILM COATED ORAL at 08:10

## 2020-03-16 RX ADMIN — ACETAMINOPHEN 1000 MG: 500 TABLET, FILM COATED ORAL at 08:10

## 2020-03-16 NOTE — ED NOTES
Patient given instruction on how to use incentive spirometer. Patient demonstrated use of incentive spirometer and verbalized understanding of all discharge instructions.

## 2020-03-16 NOTE — ED PROVIDER NOTES
Date: 3/16/2020  Patient Name: Heavenly Kunz    History of Presenting Illness     Chief Complaint   Patient presents with   Huber Hock Fall    Rib Pain    Shortness of Breath     History Provided By: Patient    HPI/Chief Complaint: (Context):who presents with left lower rib pain anteriorly. Constant symptoms worse with movement. Trauma approximately 7 days ago patient has not seen anybody for it he states he is improving but he was told by his family to come in and get checked out. Patient is no exertional chest pain or shortness of breath no abdominal pain no lightheadedness this is a mechanical fall 7 days ago when he was coming out of the bathroom. Patient did not lose his consciousness did not hit his head  Patient states he otherwise he has no complaints no chest pain or shortness of breath with exertion no abdominal pain no palpitations no focal arm or leg weakness no rash no bruising no black or bloody stool no bleeding during urination.   Patient has no frequency there  Patient is no flank pain no radiation of symptoms patient's acute symptoms are exacerbated by movement and improved with rest  No severe shortness of breath only patient states he had shortness breath yesterday when he was vacuuming otherwise there is no shortness of breath continuously    7 days symptoms constant symptoms worsening movement  Moderate pain  ---  Patient's triage note is reviewed patient with BRANDON arrival level 3  Patient's chief complaints fall rib pain shortness of breath patient's pulse is 58 rest of vitals are stable except blood pressure is 192/98  Patient states approximately a week ago he slipped when getting out of tub hit the left side there is pain with deep breath movement exertion  Pain scale 6 out of 10  Allergies are none  Home medication includes aspirin Robitussin Flonase Zofran  Medical history includes alcohol use and glaucoma  Surgical history is wisdom teeth extraction HEENT surgery  Social history is no smoking rarely alcohol per patient  Patient's chart review shows patient was admitted for an STEMI in December 2019 history of alcohol use visit prior to that. PCP: Corrine Issa MD    Current Facility-Administered Medications   Medication Dose Route Frequency Provider Last Rate Last Dose    lidocaine 4 % patch 1 Patch  1 Patch TransDERmal Q24H Lore Koch MD   1 Patch at 03/16/20 0810     Current Outpatient Medications   Medication Sig Dispense Refill    ibuprofen (MOTRIN) 600 mg tablet Take 1 Tab by mouth every six (6) hours as needed for Pain. 20 Tab 0    acetaminophen (TYLENOL) 325 mg tablet Take 2 Tabs by mouth every four (4) hours as needed for Pain for up to 3 days. 20 Tab 0    lidocaine (LIDODERM) 5 % Apply patch to the affected area for 12 hours a day and remove for 12 hours a day. 30 Each 0    aspirin delayed-release 81 mg tablet Take 81 mg by mouth daily.  ondansetron (ZOFRAN ODT) 4 mg disintegrating tablet Take 1-2 tablets every 6-8 hours as needed for nausea and vomiting. 10 Tab 0    Camphor-Eucalyptus Oil-Menthol (VICKS VAPORUB) 4.8-1.2-2.6 % oint 1 Actuation(s) by Apply Externally route three (3) times daily as needed for Cough or Other (congestion). 50 g 0    dextromethorphan-guaiFENesin (ROBITUSSIN-DM)  mg/5 mL syrup Take 10 mL by mouth every six (6) hours as needed for Cough. 240 mL 0    fluticasone (FLONASE) 50 mcg/actuation nasal spray 2 Sprays by Both Nostrils route daily. 1 Bottle 0    dextromethorphan-guaiFENesin (CORICIDIN HBP)  mg cap Take 1 Cap by mouth two (2) times daily as needed for Cough.  20 Cap 0    OTHER          Past History     Past Medical History:  Past Medical History:   Diagnosis Date    ETOH abuse     Glaucoma        Past Surgical History:  Past Surgical History:   Procedure Laterality Date    HX HEENT      HX WISDOM TEETH EXTRACTION         Family History:  Family History   Problem Relation Age of Onset    Alcohol abuse Other Social History:  Social History     Tobacco Use    Smoking status: Former Smoker    Smokeless tobacco: Never Used    Tobacco comment: wearing a patch   Substance Use Topics    Alcohol use: Yes     Comment: rarely    Drug use: No       Allergies:  No Known Allergies      Review of Systems   Review of Systems   Constitutional: Negative for activity change, fatigue and fever. HENT: Negative for congestion and rhinorrhea. Eyes: Negative for visual disturbance. Respiratory: Positive for shortness of breath. Cardiovascular: Positive for chest pain. Negative for palpitations. Gastrointestinal: Negative for abdominal pain, diarrhea, nausea and vomiting. Genitourinary: Negative for dysuria and hematuria. Musculoskeletal: Negative for back pain. Skin: Negative for rash. Neurological: Negative for dizziness, weakness and light-headedness. Psychiatric/Behavioral: Negative for agitation. All other systems reviewed and are negative. Physical Exam     Physical Exam  Constitutional:       Appearance: He is well-developed. Interventions: He is not intubated. HENT:      Head: Normocephalic and atraumatic. Eyes:      Conjunctiva/sclera: Conjunctivae normal.      Pupils: Pupils are equal, round, and reactive to light. Neck:      Musculoskeletal: Normal range of motion and neck supple. Cardiovascular:      Rate and Rhythm: Normal rate and regular rhythm. Pulmonary:      Effort: Pulmonary effort is normal. No tachypnea, bradypnea, accessory muscle usage or respiratory distress. He is not intubated. Breath sounds: Normal breath sounds. No stridor. Comments: Patient with anterior 9 through 12 rib tenderness no abdominal pain no tenderness no bruising appreciated no edema or ecchymosis  There is no open wounds  Patient has no flank tenderness on palpation. Abdominal:      General: Bowel sounds are normal.      Palpations: Abdomen is soft.    Musculoskeletal: Normal range of motion. Lymphadenopathy:      Cervical: No cervical adenopathy. Skin:     General: Skin is warm. Neurological:      Mental Status: He is alert. Medical Decision Making   I am the first provider for this patient. I reviewed the vital signs, available nursing notes, past medical history, past surgical history, family history and social history. Provider Notes (Medical Decision Making): Patient presents emergency department after a fall this was a mechanical fall  Patient history of coronary artery disease but no exertional chest pain or shortness of breath recently  Patient has only rib pain patient's x-ray to be done to rule out pneumothorax or pleural effusion if none present  Patient can be sent home with symptomatic relief, lidocaine patch, incentive spirometer and follow-up with the PCP  Patient understands and agrees all this information agrees with the plan and will return to the emergency department anything changes or worsens. Vital Signs-Reviewed the patient's vital signs. Pulse Oximetry Analysis -97%, room air, normal    Vitals:    03/16/20 0755   BP: 192/90   Pulse: (!) 58   Resp: 20   Temp: 98.7 °F (37.1 °C)   SpO2: 97%   Weight: 77.1 kg (170 lb)   Height: 5' 9.5\" (1.765 m)       Records Reviewed: Nursing Notes    ED Course:   8:24 AM  Patient's chest x-ray reviewed by myself  Patient's x-ray with a right-sided blunting of the diaphragm question of chronic pleural effusion versus scarring. Patient's chest x-ray reviewed and compared with the old one does not have any acute changes there is no rib fractures that I can appreciate on the left side there is no pleural effusion and there is no change from prior suggesting any pulmonary edema or pulmonary contusion. There is no pneumothorax. Discharge Note:  8:26 AM  The pt is ready for discharge. The pt's signs, symptoms, diagnosis, and discharge instructions have been discussed and pt has conveyed their understanding.  The pt is to follow up as recommended or return to ER should their symptoms worsen. Plan has been discussed and pt is in agreement. Diagnostic Study Results     Orders Placed This Encounter    XR CHEST PORT     Standing Status:   Standing     Number of Occurrences:   1     Order Specific Question:   Reason for Exam     Answer:   chest pain    INCENTIVE SPIROMETRY     Instruct on use for home. Standing Status:   Standing     Number of Occurrences:   1    lidocaine 4 % patch 1 Patch    acetaminophen (TYLENOL) tablet 1,000 mg    ibuprofen (MOTRIN) tablet 600 mg    ibuprofen (MOTRIN) 600 mg tablet     Sig: Take 1 Tab by mouth every six (6) hours as needed for Pain. Dispense:  20 Tab     Refill:  0    acetaminophen (TYLENOL) 325 mg tablet     Sig: Take 2 Tabs by mouth every four (4) hours as needed for Pain for up to 3 days. Dispense:  20 Tab     Refill:  0    lidocaine (LIDODERM) 5 %     Sig: Apply patch to the affected area for 12 hours a day and remove for 12 hours a day. Dispense:  30 Each     Refill:  0       Labs -   No results found for this or any previous visit (from the past 12 hour(s)). Radiologic Studies -   XR CHEST PORT    (Results Pending)     CT Results  (Last 48 hours)    None        CXR Results  (Last 48 hours)    None              Discharge     Clinical Impression:   1. Rib contusion, left, initial encounter    2.  Left-sided chest pain        Disposition:  Home    It should be noted that I will be the provider of record for this patient  Randall Stock MD      Follow-up Information     Follow up With Specialties Details Why Contact Info    Edilma Ayers MD Family Practice Call in 1 day Follow Up From Emergency Department [address] [facility] [address] EMERGENCY DEPT Emergency Medicine  If symptoms worsen [address]  [phone]          Current Discharge Medication List      START taking these medications Details   ibuprofen (MOTRIN) 600 mg tablet Take 1 Tab by mouth every six (6) hours as needed for Pain. Qty: 20 Tab, Refills: 0      acetaminophen (TYLENOL) 325 mg tablet Take 2 Tabs by mouth every four (4) hours as needed for Pain for up to 3 days. Qty: 20 Tab, Refills: 0      lidocaine (LIDODERM) 5 % Apply patch to the affected area for 12 hours a day and remove for 12 hours a day.   Qty: 30 Each, Refills: 0

## 2020-03-16 NOTE — DISCHARGE INSTRUCTIONS
Patient Education        Chest Contusion: Care Instructions  Your Care Instructions  A chest contusion, or bruise, is caused by a fall or direct blow to the chest. Car crashes, falls, getting punched, and injury from bicycle handlebars are common causes of chest contusions. A very forceful blow to the chest can injure the heart or blood vessels in the chest, the lungs, the airway, the liver, or the spleen. Pain may be caused by an injury to muscles, cartilage, or ribs. Deep breathing, coughing, or sneezing can increase your pain. Lying on the injured area also can cause pain. Follow-up care is a key part of your treatment and safety. Be sure to make and go to all appointments, and call your doctor if you are having problems. It's also a good idea to know your test results and keep a list of the medicines you take. How can you care for yourself at home? · Rest and protect the injured or sore area. Stop, change, or take a break from any activity that may be causing your pain. · Put ice or a cold pack on the area for 10 to 20 minutes at a time. Put a thin cloth between the ice and your skin. · After 2 or 3 days, if your swelling is gone, apply a heating pad set on low or a warm cloth to your chest. Some doctors suggest that you go back and forth between hot and cold. Put a thin cloth between the heating pad and your skin. · Do not wrap or tape your ribs for support. This may cause you to take smaller breaths, which could increase your risk of pneumonia and lung collapse. · Ask your doctor if you can take an over-the-counter pain medicine, such as acetaminophen (Tylenol), ibuprofen (Advil, Motrin), or naproxen (Aleve). Be safe with medicines. Read and follow all instructions on the label. · Take your medicines exactly as prescribed. Call your doctor if you think you are having a problem with your medicine.   · Gentle stretching and massage may help you feel better after a few days of rest. Stretch slowly to the point just before discomfort begins, then hold the stretch for at least 15 to 30 seconds. Do this 3 or 4 times per day. · As your pain gets better, slowly return to your normal activities. Be patient, because chest bruises can take weeks or months to heal. Any increased pain may be a sign that you need to rest a while longer. When should you call for help? Call 911 anytime you think you may need emergency care. For example, call if:    · You have severe trouble breathing.     · You cough up blood.    Call your doctor now or seek immediate medical care if:    · You have belly pain.     · You are dizzy or lightheaded, or you feel like you may faint.     · You develop new symptoms with the chest pain.     · Your chest pain gets worse.     · You have a fever.     · You have some shortness of breath.     · You have a cough that brings up mucus from the lungs.    Watch closely for changes in your health, and be sure to contact your doctor if:    · Your chest pain is not improving after 1 week. Where can you learn more? Go to http://dagoberto-leydi.info/  Enter I174 in the search box to learn more about \"Chest Contusion: Care Instructions. \"  Current as of: June 26, 2019Content Version: 12.4  © 2389-7381 [employer]wise, Incorporated. Care instructions adapted under license by [email or web address] (which disclaims liability or warranty for this information). If you have questions about a medical condition or this instruction, always ask your healthcare professional. [place] [address] any warranty or liability for your use of this information. Patient Education        Bruised Rib: Care Instructions  Overview    You can get a bruised rib if you fall or get hit, such as in an accident or while playing sports. The medical term for a bruise is \"contusion. \" Small blood vessels get torn and leak blood under the skin. Most people think of a bruise as a black-and-blue area.  But bones and muscles can also get bruised. An injury may damage the rib but not cause a bruise that you can see. Sometimes it can be hard to tell if a rib is bruised or broken. The symptoms may be the same. And a broken bone can't always be seen on an X-ray. But the treatment for a bruised rib is often the same as treatment for a broken one. An injury to the ribs can cause pain. The pain may be worse when you breathe deeply, cough, or sneeze. In most cases, a bruised rib will heal on its own. You can take pain medicine while the rib mends. Pain relief allows you to take deep breaths. Follow-up care is a key part of your treatment and safety. Be sure to make and go to all appointments, and call your doctor if you are having problems. It's also a good idea to know your test results and keep a list of the medicines you take. How can you care for yourself at home? · Rest and protect the injured or sore area. Stop, change, or take a break from any activity that causes pain. · Put ice or a cold pack on the area for 10 to 20 minutes at a time. Put a thin cloth between the ice and your skin. · After 2 or 3 days, if your swelling is gone, put a heating pad set on low or a warm cloth on your chest. Some doctors suggest that you go back and forth between hot and cold. Put a thin cloth between the heating pad and your skin. · Ask your doctor if you can take an over-the-counter pain medicine, such as acetaminophen (Tylenol), ibuprofen (Advil, Motrin), or naproxen (Aleve). Be safe with medicines. Read and follow all instructions on the label. · As your pain gets better, slowly return to your normal activities. Be patient. Rib bruises can take weeks or months to heal. If the pain gets worse, it may be a sign that you need to rest a while longer. When should you call for help? Call 911 anytime you think you may need emergency care.  For example, call if:    · You have severe trouble breathing.     Call your doctor now or seek immediate medical care if:    · You have trouble breathing.     · You have a fever.     · You have a new or worse cough.     · You have new or worse pain.    Watch closely for changes in your health, and be sure to contact your doctor if:    · You do not get better as expected. Where can you learn more? Go to http://dagoberto-leydi.info/  Enter R125 in the search box to learn more about \"Bruised Rib: Care Instructions. \"  Current as of: June 26, 2019Content Version: 12.4  © 1749-6853 [employer]wise, Incorporated. Care instructions adapted under license by [employer] (which disclaims liability or warranty for this information). If you have questions about a medical condition or this instruction, always ask your healthcare professional. [address] any warranty or liability for your use of this information.

## 2020-03-16 NOTE — ED TRIAGE NOTES
Pt states on 3/11 he slipped when getting out of the tub and hit left side chest on the commode. Patient states pain with exertion and deep breathes and shortness of breath on exertion. Pt is speaking in full sentences.

## 2020-03-17 ENCOUNTER — PATIENT OUTREACH (OUTPATIENT)
Dept: CASE MANAGEMENT | Age: 70
End: 2020-03-17

## 2020-03-17 NOTE — PROGRESS NOTES
ACM attempted to contact patient at listed number. VM left identifying self. Direct contact information given with request for return call.

## 2020-03-23 ENCOUNTER — PATIENT OUTREACH (OUTPATIENT)
Dept: CASE MANAGEMENT | Age: 70
End: 2020-03-23

## 2020-03-23 NOTE — PROGRESS NOTES
ACM attempted to contact patient at listed number for COVID -19 screening following discharge from ED/Hospital. VM left identifying self. Direct contact information given with request for return call.

## 2020-04-03 ENCOUNTER — PATIENT OUTREACH (OUTPATIENT)
Dept: CASE MANAGEMENT | Age: 70
End: 2020-04-03

## 2020-04-03 NOTE — PROGRESS NOTES
ACM attempted to contact patient at listed number for COVID -19 screening follow up. VM left identifying self, direct contact information given.     Episode resolved

## 2020-05-04 ENCOUNTER — APPOINTMENT (OUTPATIENT)
Dept: GENERAL RADIOLOGY | Age: 70
End: 2020-05-04
Attending: EMERGENCY MEDICINE
Payer: MEDICARE

## 2020-05-04 ENCOUNTER — HOSPITAL ENCOUNTER (EMERGENCY)
Age: 70
Discharge: HOME | End: 2020-05-05
Attending: EMERGENCY MEDICINE
Payer: MEDICARE

## 2020-05-04 DIAGNOSIS — R79.89 ABNORMAL LIVER FUNCTION TEST: ICD-10-CM

## 2020-05-04 DIAGNOSIS — R53.83 FATIGUE, UNSPECIFIED TYPE: ICD-10-CM

## 2020-05-04 DIAGNOSIS — R10.84 ABDOMINAL PAIN, GENERALIZED: ICD-10-CM

## 2020-05-04 DIAGNOSIS — R63.0 DECREASED APPETITE: Primary | ICD-10-CM

## 2020-05-04 PROCEDURE — 82550 ASSAY OF CK (CPK): CPT

## 2020-05-04 PROCEDURE — 80307 DRUG TEST PRSMV CHEM ANLYZR: CPT

## 2020-05-04 PROCEDURE — 71045 X-RAY EXAM CHEST 1 VIEW: CPT

## 2020-05-04 PROCEDURE — 84443 ASSAY THYROID STIM HORMONE: CPT

## 2020-05-04 PROCEDURE — 74011250636 HC RX REV CODE- 250/636: Performed by: EMERGENCY MEDICINE

## 2020-05-04 PROCEDURE — 99285 EMERGENCY DEPT VISIT HI MDM: CPT

## 2020-05-04 PROCEDURE — 96374 THER/PROPH/DIAG INJ IV PUSH: CPT

## 2020-05-04 PROCEDURE — 93005 ELECTROCARDIOGRAM TRACING: CPT

## 2020-05-04 PROCEDURE — 94761 N-INVAS EAR/PLS OXIMETRY MLT: CPT

## 2020-05-04 PROCEDURE — 96372 THER/PROPH/DIAG INJ SC/IM: CPT

## 2020-05-04 PROCEDURE — 83880 ASSAY OF NATRIURETIC PEPTIDE: CPT

## 2020-05-04 PROCEDURE — 85025 COMPLETE CBC W/AUTO DIFF WBC: CPT

## 2020-05-04 PROCEDURE — 96361 HYDRATE IV INFUSION ADD-ON: CPT

## 2020-05-04 PROCEDURE — 80053 COMPREHEN METABOLIC PANEL: CPT

## 2020-05-04 PROCEDURE — 96375 TX/PRO/DX INJ NEW DRUG ADDON: CPT

## 2020-05-04 PROCEDURE — 85610 PROTHROMBIN TIME: CPT

## 2020-05-04 PROCEDURE — 83690 ASSAY OF LIPASE: CPT

## 2020-05-04 PROCEDURE — 83735 ASSAY OF MAGNESIUM: CPT

## 2020-05-04 PROCEDURE — 82140 ASSAY OF AMMONIA: CPT

## 2020-05-04 RX ORDER — FAMOTIDINE 10 MG/ML
20 INJECTION INTRAVENOUS
Status: COMPLETED | OUTPATIENT
Start: 2020-05-04 | End: 2020-05-04

## 2020-05-04 RX ORDER — ONDANSETRON 2 MG/ML
4 INJECTION INTRAMUSCULAR; INTRAVENOUS
Status: COMPLETED | OUTPATIENT
Start: 2020-05-04 | End: 2020-05-04

## 2020-05-04 RX ADMIN — SODIUM CHLORIDE 1000 ML: 900 INJECTION, SOLUTION INTRAVENOUS at 23:47

## 2020-05-04 RX ADMIN — FAMOTIDINE 20 MG: 10 INJECTION INTRAVENOUS at 23:45

## 2020-05-04 RX ADMIN — ONDANSETRON 4 MG: 2 INJECTION INTRAMUSCULAR; INTRAVENOUS at 23:45

## 2020-05-05 ENCOUNTER — APPOINTMENT (OUTPATIENT)
Dept: CT IMAGING | Age: 70
End: 2020-05-05
Attending: EMERGENCY MEDICINE
Payer: MEDICARE

## 2020-05-05 VITALS
RESPIRATION RATE: 21 BRPM | HEIGHT: 69 IN | BODY MASS INDEX: 24.44 KG/M2 | HEART RATE: 65 BPM | WEIGHT: 165 LBS | OXYGEN SATURATION: 95 % | SYSTOLIC BLOOD PRESSURE: 132 MMHG | DIASTOLIC BLOOD PRESSURE: 61 MMHG | TEMPERATURE: 98.3 F

## 2020-05-05 LAB
ALBUMIN SERPL-MCNC: 2.9 G/DL (ref 3.4–5)
ALBUMIN/GLOB SERPL: 0.7 {RATIO} (ref 0.8–1.7)
ALP SERPL-CCNC: 206 U/L (ref 45–117)
ALT SERPL-CCNC: 239 U/L (ref 16–61)
AMMONIA PLAS-SCNC: 36 UMOL/L (ref 11–32)
ANION GAP SERPL CALC-SCNC: 9 MMOL/L (ref 3–18)
APPEARANCE UR: CLEAR
AST SERPL-CCNC: 375 U/L (ref 10–38)
ATRIAL RATE: 82 BPM
BACTERIA URNS QL MICRO: ABNORMAL /HPF
BASOPHILS # BLD: 0 K/UL (ref 0–0.1)
BASOPHILS NFR BLD: 0 % (ref 0–2)
BILIRUB SERPL-MCNC: 5.5 MG/DL (ref 0.2–1)
BILIRUB UR QL: ABNORMAL
BNP SERPL-MCNC: 487 PG/ML (ref 0–900)
BUN SERPL-MCNC: 17 MG/DL (ref 7–18)
BUN/CREAT SERPL: 11 (ref 12–20)
CALCIUM SERPL-MCNC: 8.5 MG/DL (ref 8.5–10.1)
CALCULATED P AXIS, ECG09: 61 DEGREES
CALCULATED R AXIS, ECG10: 55 DEGREES
CALCULATED T AXIS, ECG11: -112 DEGREES
CHLORIDE SERPL-SCNC: 97 MMOL/L (ref 100–111)
CK MB CFR SERPL CALC: ABNORMAL % (ref 0–4)
CK MB SERPL-MCNC: <1 NG/ML (ref 5–25)
CK SERPL-CCNC: 113 U/L (ref 39–308)
CO2 SERPL-SCNC: 24 MMOL/L (ref 21–32)
COLOR UR: ABNORMAL
CREAT SERPL-MCNC: 1.48 MG/DL (ref 0.6–1.3)
DIAGNOSIS, 93000: NORMAL
DIFFERENTIAL METHOD BLD: ABNORMAL
EOSINOPHIL # BLD: 0 K/UL (ref 0–0.4)
EOSINOPHIL NFR BLD: 1 % (ref 0–5)
EPITH CASTS URNS QL MICRO: ABNORMAL /LPF (ref 0–5)
ERYTHROCYTE [DISTWIDTH] IN BLOOD BY AUTOMATED COUNT: 15.8 % (ref 11.6–14.5)
ETHANOL SERPL-MCNC: <3 MG/DL (ref 0–3)
GLOBULIN SER CALC-MCNC: 4.1 G/DL (ref 2–4)
GLUCOSE SERPL-MCNC: 92 MG/DL (ref 74–99)
GLUCOSE UR STRIP.AUTO-MCNC: NEGATIVE MG/DL
HAV IGM SER QL: NEGATIVE
HBV CORE IGM SER QL: NEGATIVE
HBV SURFACE AG SER QL: <0.1 INDEX
HBV SURFACE AG SER QL: NEGATIVE
HCT VFR BLD AUTO: 41.5 % (ref 36–48)
HCV AB SER IA-ACNC: 0.08 INDEX
HCV AB SERPL QL IA: NEGATIVE
HCV COMMENT,HCGAC: NORMAL
HGB BLD-MCNC: 14.8 G/DL (ref 13–16)
HGB UR QL STRIP: NEGATIVE
INR PPP: 1.1 (ref 0.8–1.2)
KETONES UR QL STRIP.AUTO: ABNORMAL MG/DL
LEUKOCYTE ESTERASE UR QL STRIP.AUTO: ABNORMAL
LIPASE SERPL-CCNC: 192 U/L (ref 73–393)
LYMPHOCYTES # BLD: 2.1 K/UL (ref 0.9–3.6)
LYMPHOCYTES NFR BLD: 35 % (ref 21–52)
MAGNESIUM SERPL-MCNC: 1.9 MG/DL (ref 1.6–2.6)
MCH RBC QN AUTO: 29.8 PG (ref 24–34)
MCHC RBC AUTO-ENTMCNC: 35.7 G/DL (ref 31–37)
MCV RBC AUTO: 83.5 FL (ref 74–97)
MONOCYTES # BLD: 0.5 K/UL (ref 0.05–1.2)
MONOCYTES NFR BLD: 8 % (ref 3–10)
MUCOUS THREADS URNS QL MICRO: ABNORMAL /LPF
NEUTS SEG # BLD: 3.3 K/UL (ref 1.8–8)
NEUTS SEG NFR BLD: 56 % (ref 40–73)
NITRITE UR QL STRIP.AUTO: NEGATIVE
P-R INTERVAL, ECG05: 150 MS
PH UR STRIP: 6 [PH] (ref 5–8)
PLATELET # BLD AUTO: 139 K/UL (ref 135–420)
PMV BLD AUTO: 10.4 FL (ref 9.2–11.8)
POTASSIUM SERPL-SCNC: 4.2 MMOL/L (ref 3.5–5.5)
PROT SERPL-MCNC: 7 G/DL (ref 6.4–8.2)
PROT UR STRIP-MCNC: 30 MG/DL
PROTHROMBIN TIME: 13.5 SEC (ref 11.5–15.2)
Q-T INTERVAL, ECG07: 348 MS
QRS DURATION, ECG06: 108 MS
QTC CALCULATION (BEZET), ECG08: 406 MS
RBC # BLD AUTO: 4.97 M/UL (ref 4.7–5.5)
RBC #/AREA URNS HPF: ABNORMAL /HPF (ref 0–5)
SODIUM SERPL-SCNC: 130 MMOL/L (ref 136–145)
SP GR UR REFRACTOMETRY: 1.01 (ref 1–1.03)
SP1: NORMAL
SP2: NORMAL
SP3: NORMAL
TROPONIN I SERPL-MCNC: 0.08 NG/ML (ref 0–0.04)
TROPONIN I SERPL-MCNC: 0.12 NG/ML (ref 0–0.04)
TSH SERPL DL<=0.05 MIU/L-ACNC: 1.84 UIU/ML (ref 0.36–3.74)
UROBILINOGEN UR QL STRIP.AUTO: 2 EU/DL (ref 0.2–1)
VENTRICULAR RATE, ECG03: 82 BPM
WBC # BLD AUTO: 5.8 K/UL (ref 4.6–13.2)
WBC URNS QL MICRO: ABNORMAL /HPF (ref 0–4)

## 2020-05-05 PROCEDURE — 74011000258 HC RX REV CODE- 258: Performed by: EMERGENCY MEDICINE

## 2020-05-05 PROCEDURE — 74011250637 HC RX REV CODE- 250/637: Performed by: EMERGENCY MEDICINE

## 2020-05-05 PROCEDURE — 84484 ASSAY OF TROPONIN QUANT: CPT

## 2020-05-05 PROCEDURE — 80074 ACUTE HEPATITIS PANEL: CPT

## 2020-05-05 PROCEDURE — 96372 THER/PROPH/DIAG INJ SC/IM: CPT

## 2020-05-05 PROCEDURE — 71260 CT THORAX DX C+: CPT

## 2020-05-05 PROCEDURE — 74011636320 HC RX REV CODE- 636/320: Performed by: EMERGENCY MEDICINE

## 2020-05-05 PROCEDURE — 74011250636 HC RX REV CODE- 250/636: Performed by: EMERGENCY MEDICINE

## 2020-05-05 PROCEDURE — 81001 URINALYSIS AUTO W/SCOPE: CPT

## 2020-05-05 PROCEDURE — 96361 HYDRATE IV INFUSION ADD-ON: CPT

## 2020-05-05 RX ORDER — DICYCLOMINE HYDROCHLORIDE 10 MG/1
10 CAPSULE ORAL
Qty: 20 CAP | Refills: 0 | Status: SHIPPED | OUTPATIENT
Start: 2020-05-05 | End: 2020-05-10

## 2020-05-05 RX ORDER — DEXTROSE MONOHYDRATE AND SODIUM CHLORIDE 5; .9 G/100ML; G/100ML
1000 INJECTION, SOLUTION INTRAVENOUS ONCE
Status: COMPLETED | OUTPATIENT
Start: 2020-05-05 | End: 2020-05-05

## 2020-05-05 RX ORDER — THERA TABS 400 MCG
1 TAB ORAL
Status: COMPLETED | OUTPATIENT
Start: 2020-05-05 | End: 2020-05-05

## 2020-05-05 RX ORDER — ONDANSETRON 4 MG/1
4 TABLET, ORALLY DISINTEGRATING ORAL
Qty: 12 TAB | Refills: 0 | Status: SHIPPED | OUTPATIENT
Start: 2020-05-05 | End: 2022-10-05 | Stop reason: ALTCHOICE

## 2020-05-05 RX ORDER — THIAMINE HYDROCHLORIDE 100 MG/ML
200 INJECTION, SOLUTION INTRAMUSCULAR; INTRAVENOUS
Status: COMPLETED | OUTPATIENT
Start: 2020-05-05 | End: 2020-05-05

## 2020-05-05 RX ORDER — PHENOL/SODIUM PHENOLATE
20 AEROSOL, SPRAY (ML) MUCOUS MEMBRANE DAILY
Qty: 30 TAB | Refills: 1 | Status: SHIPPED | OUTPATIENT
Start: 2020-05-05 | End: 2022-10-05 | Stop reason: ALTCHOICE

## 2020-05-05 RX ORDER — FOLIC ACID 1 MG/1
1 TABLET ORAL
Status: COMPLETED | OUTPATIENT
Start: 2020-05-05 | End: 2020-05-05

## 2020-05-05 RX ADMIN — IOPAMIDOL 100 ML: 612 INJECTION, SOLUTION INTRAVENOUS at 01:47

## 2020-05-05 RX ADMIN — THIAMINE HYDROCHLORIDE 200 MG: 100 INJECTION, SOLUTION INTRAMUSCULAR; INTRAVENOUS at 00:59

## 2020-05-05 RX ADMIN — FOLIC ACID 1 MG: 1 TABLET ORAL at 00:52

## 2020-05-05 RX ADMIN — THERA TABS 1 TABLET: TAB at 00:53

## 2020-05-05 RX ADMIN — DEXTROSE MONOHYDRATE AND SODIUM CHLORIDE 1000 ML: 5; .9 INJECTION, SOLUTION INTRAVENOUS at 00:52

## 2020-05-05 NOTE — ED TRIAGE NOTES
Patient to the ED via EMS with a c/c of decreased appetite x 4 days or so. States that last Thursday he also had a syncopal episode at [facility], states he was not seen at that time. Since then, he states he has been unsteady on his feet. Says that he will occasionally become nauseated, but it is not consistent. States that he will intermittently become SOB, but denies cough. States that just prior to EMS arrival, he felt as though he was getting ready to pass out.

## 2020-05-05 NOTE — ED NOTES
Patient states that when he passed out last Thursday, 911 was called. He said \"when I was in the back of the ambulance, there was something with spikes on the screen that was at 40. They told me that it should be at 80\". Pt states that EMS was trying to get him to go to the hospital then, but he told them that he thought it would pass.

## 2020-05-05 NOTE — DISCHARGE INSTRUCTIONS
Patient Education        Abdominal Pain: Care Instructions  Your Care Instructions    Abdominal pain has many possible causes. Some aren't serious and get better on their own in a few days. Others need more testing and treatment. If your pain continues or gets worse, you need to be rechecked and may need more tests to find out what is wrong. You may need surgery to correct the problem. Don't ignore new symptoms, such as fever, nausea and vomiting, urination problems, pain that gets worse, and dizziness. These may be signs of a more serious problem. Your doctor may have recommended a follow-up visit in the next 8 to 12 hours. If you are not getting better, you may need more tests or treatment. The doctor has checked you carefully, but problems can develop later. If you notice any problems or new symptoms, get medical treatment right away. Follow-up care is a key part of your treatment and safety. Be sure to make and go to all appointments, and call your doctor if you are having problems. It's also a good idea to know your test results and keep a list of the medicines you take. How can you care for yourself at home? · Rest until you feel better. · To prevent dehydration, drink plenty of fluids, enough so that your urine is light yellow or clear like water. Choose water and other caffeine-free clear liquids until you feel better. If you have kidney, heart, or liver disease and have to limit fluids, talk with your doctor before you increase the amount of fluids you drink. · If your stomach is upset, eat mild foods, such as rice, dry toast or crackers, bananas, and applesauce. Try eating several small meals instead of two or three large ones. · Wait until 48 hours after all symptoms have gone away before you have spicy foods, alcohol, and drinks that contain caffeine. · Do not eat foods that are high in fat. · Avoid anti-inflammatory medicines such as aspirin, ibuprofen (Advil, Motrin), and naproxen (Aleve). These can cause stomach upset. Talk to your doctor if you take daily aspirin for another health problem. When should you call for help? Call 911 anytime you think you may need emergency care. For example, call if:    · You passed out (lost consciousness).     · You pass maroon or very bloody stools.     · You vomit blood or what looks like coffee grounds.     · You have new, severe belly pain.    Call your doctor now or seek immediate medical care if:    · Your pain gets worse, especially if it becomes focused in one area of your belly.     · You have a new or higher fever.     · Your stools are black and look like tar, or they have streaks of blood.     · You have unexpected vaginal bleeding.     · You have symptoms of a urinary tract infection. These may include:  ? Pain when you urinate. ? Urinating more often than usual.  ? Blood in your urine.     · You are dizzy or lightheaded, or you feel like you may faint.    Watch closely for changes in your health, and be sure to contact your doctor if:    · You are not getting better after 1 day (24 hours). Where can you learn more? Go to http://dagobertoHackPadleydi.info/  Enter A640 in the search box to learn more about \"Abdominal Pain: Care Instructions. \"  Current as of: June 26, 2019Content Version: 12.4  © 2924-9048 Healthwise, Incorporated. Care instructions adapted under license by [employer] (which disclaims liability or warranty for this information). If you have questions about a medical condition or this instruction, always ask your healthcare professional. [place] [address] any warranty or liability for your use of this information. Patient Education        Fatigue: Care Instructions  Your Care Instructions    Fatigue is a feeling of tiredness, exhaustion, or lack of energy. You may feel fatigue because of too much or not enough activity. It can also come from stress, lack of sleep, boredom, and poor diet. Many medical problems, such as viral infections, can cause fatigue. Emotional problems, especially depression, are often the cause of fatigue. Fatigue is most often a symptom of another problem. Treatment for fatigue depends on the cause. For example, if you have fatigue because you have a certain health problem, treating this problem also treats your fatigue. If depression or anxiety is the cause, treatment may help. Follow-up care is a key part of your treatment and safety. Be sure to make and go to all appointments, and call your doctor if you are having problems. It's also a good idea to know your test results and keep a list of the medicines you take. How can you care for yourself at home? · Get regular exercise. But don't overdo it. Go back and forth between rest and exercise. · Get plenty of rest.  · Eat a healthy diet. Do not skip meals, especially breakfast.  · Reduce your use of caffeine, tobacco, and alcohol. Caffeine is most often found in coffee, tea, cola drinks, and chocolate. · Limit medicines that can cause fatigue. This includes tranquilizers and cold and allergy medicines. When should you call for help? Watch closely for changes in your health, and be sure to contact your doctor if:    · You have new symptoms such as fever or a rash.     · Your fatigue gets worse.     · You have been feeling down, depressed, or hopeless. Or you may have lost interest in things that you usually enjoy.     · You are not getting better as expected. Where can you learn more? Go to http://dagoberto-leydi.info/  Enter [id number] in the search box to learn more about \"Fatigue: Care Instructions. \"  Current as of: June 26, 2019Content Version: 12.4  © 1120-0319 Healthwise, Incorporated. Care instructions adapted under license by [employer] (which disclaims liability or warranty for this information).  If you have questions about a medical condition or this instruction, always ask your healthcare professional. [place] [address] any warranty or liability for your use of this information. Patient Education        Anorexia: Care Instructions  Your Care Instructions    Anorexia is a type of eating disorder. People who have anorexia don't eat enough to stay at a normal weight. Sometimes they also exercise too much. They do these things because they're so afraid of gaining weight. They believe that they're fat, even when they're thin. Often they think that losing even more weight will solve their problems and make their lives better. If you have anorexia, it can really harm your health. This is because your body doesn't get the nutrition it needs. The first step to controlling the problem is admitting that something is wrong. Then counseling can help you change how you think about food, the way you see your body, and any other emotional issues. It may take months or years, but you can recover from anorexia. Follow-up care is a key part of your treatment and safety. Be sure to make and go to all appointments, and call your doctor if you are having problems. It's also a good idea to know your test results and keep a list of the medicines you take. How can you care for yourself at home? Follow your treatment plan  · Go to your counseling sessions. Call or talk with your counselor if you can't go or if you think the sessions aren't helping. Don't just stop going. · Take your medicines exactly as prescribed. Call your doctor if you think you are having a problem with your medicine. You will get more details on the specific medicines your doctor prescribes. Trust people who are helping you  · Listen to what counselors and nutrition experts say about healthy eating. · Learn about what is included in a balanced diet. Then discuss what you learn. · Let people know how you are feeling. Listen to how they are feeling too. · Accept support and feedback from other people.   Learn to be easier on yourself  · Learn to focus on your good qualities. · If your body feels weak, slow down and do less. · Remind yourself that feeling bad about yourself is all part of your disorder. · Remember that it takes time to recover from anorexia. · Spend time with other people doing things you enjoy. · Try to focus on one goal at a time. · Don't blame yourself for your disorder. Develop healthy eating habits  · With your doctor and counselor, you will decide on a good weight for you. You will also decide how much weight you will gain each week. · Try not to argue with the people you eat with. Arguing increases stress. And stress can make make it harder for you to relax and eat. · Talk with other people during meals about things that interest you. Don't talk about food or gaining weight. Caring for a loved one with anorexia  · Remind yourself that anorexia is a long-lasting disorder. It takes time for changes to occur. · Show love and support for your loved one, especially if he or she gets discouraged. · Support your loved one as he or she goes through the steps of recovery. Focus on wellness. Don't focus on food. · Take care of yourself. Continue with your own interests, career, hobbies, and friends. · Don't blame yourself or look for the reason for the disorder. · If you are having a hard time, talk with a counselor. · Learn what to do if your loved one relapses. When should you call for help? Call 911 anytime you think you may need emergency care. For example, call if:    · A person with anorexia seems depressed and is talking about suicide. If the talk about suicide seems real, stay with the person, or ask someone you trust to stay with the person, until you get emergency help.     · You have a rapid or irregular heartbeat.     · You passed out (lost consciousness).    Call your doctor now or seek immediate medical care if:    · You feel hopeless or have thoughts of hurting yourself.  Watch closely for changes in your health, and be sure to contact your doctor if:    · You have trouble sleeping.     · You feel anxious or depressed. Where can you learn more? Go to http://dagoberto-leydi.info/  Enter N698 in the search box to learn more about \"Anorexia: Care Instructions. \"  Current as of: May 28, 2019Content Version: 12.4  © 5654-9353 [employer]. Care instructions adapted under license by [employer] (which disclaims liability or warranty for this information). If you have questions about a medical condition or this instruction, always ask your healthcare professional. [place] [address] any warranty or liability for your use of this information. Patient Education        Liver Function Tests: About These Tests  What is it? Liver function tests check how well your liver is working. Some tests measure the amount of certain enzymes in your blood to check whether the liver is damaged or inflamed. Other tests measure how well the liver can make important proteins or clear waste products from the body. Your doctor will use the test results to help look for certain conditions, such as hepatitis, cirrhosis, or gallbladder problems. Test results that are not normal do not always mean there is a problem with your liver. Your doctor can determine if there is a problem based on your results. The tests may include:  · Alkaline phosphatase (ALP). This test measures the amount of the enzyme ALP in your blood. · Total protein. A total serum protein test measures the amounts of two major groups of proteins in your blood (albumin and globulin) and the total amount of protein. · Bilirubin. This test measures the amount of bilirubin in your blood. When bilirubin levels are high, the skin and whites of the eyes may appear yellow (jaundice). This may be caused by liver disease.   · Aspartate aminotransferase (AST) and alanine aminotransferase (ALT). These tests measure the amount of the enzymes AST and ALT in your blood. (Aminotransferase is also known as a transaminase. High levels may be called transaminitis.)  Why is this test done? Liver function tests check how well your liver is working. Some tests help show whether your liver is damaged or inflamed. Your doctor may order liver function tests if you have symptoms of liver disease. These tests also may be done to see how well a treatment for liver disease is working. How can you prepare for the test?  In general, you do not need to prepare before having these tests. Your doctor may give you some specific instructions to prepare. What happens during the test?  A health professional takes a sample of your blood. What happens after the test?  You will probably be able to go home right away. When should you call for help? Watch closely for changes in your health, and be sure to contact your doctor if you have any problems. Follow-up care is a key part of your treatment and safety. Be sure to make and go to all appointments, and call your doctor if you are having problems. It's also a good idea to keep a list of the medicines you take. Ask your doctor when you can expect to have your test results. Where can you learn more? Go to http://dagoberto-leydi.info/  Enter M077 in the search box to learn more about \"Liver Function Tests: About These Tests. \"  Current as of: December 8, 2019Content Version: 12.4  © 0741-7033 Healthwise, Incorporated. Care instructions adapted under license by [employer] (which disclaims liability or warranty for this information). If you have questions about a medical condition or this instruction, always ask your healthcare professional. [address] any warranty or liability for your use of this information.

## 2020-05-05 NOTE — ED NOTES
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

## 2020-05-05 NOTE — ED NOTES
I have reviewed discharge instructions with the patient. The patient verbalized understanding. Given rx and follow up info, pt states understanding.   Ambulatory on dc

## 2020-05-05 NOTE — ED PROVIDER NOTES
Elisa Pierce is a 71 y.o. male with prior history of alcohol abuse with complaints of not feeling well for the last few days. Patient states he has not had an appetite and feels increasingly fatigued. He states he has not ate anything solid in about patient states he felt lightheaded and dizzy and passed out about 4 5 days ago in a grocery store. He did not get seek treatment for that at that time. Patient states he feels slightly short of breath. He denies chest pain, fever, sweats, vomiting, diarrhea. There is no blood in the stool or melena. He has decreased urinary output. He states he does drink alcohol on a regular basis. He denies other illicit drug use. Symptoms are worse with any activity. He thinks he has lost weight as well. The history is provided by the patient and medical records.         Past Medical History:   Diagnosis Date    ETOH abuse     Glaucoma        Past Surgical History:   Procedure Laterality Date    HX HEENT      HX WISDOM TEETH EXTRACTION           Family History:   Problem Relation Age of Onset    Alcohol abuse Other        Social History     Socioeconomic History    Marital status: SINGLE     Spouse name: Not on file    Number of children: Not on file    Years of education: Not on file    Highest education level: Not on file   Occupational History    Not on file   Social Needs    Financial resource strain: Not on file    Food insecurity     Worry: Not on file     Inability: Not on file    Transportation needs     Medical: Not on file     Non-medical: Not on file   Tobacco Use    Smoking status: Former Smoker    Smokeless tobacco: Never Used    Tobacco comment: wearing a patch   Substance and Sexual Activity    Alcohol use: Yes     Comment: rarely    Drug use: No    Sexual activity: Not on file   Lifestyle    Physical activity     Days per week: Not on file     Minutes per session: Not on file    Stress: Not on file   Relationships    Social connections     Talks on phone: Not on file     Gets together: Not on file     Attends [employer] service: Not on file     Active member of club or organization: Not on file     Attends meetings of clubs or organizations: Not on file     Relationship status: Not on file    Intimate partner violence     Fear of current or ex partner: Not on file     Emotionally abused: Not on file     Physically abused: Not on file     Forced sexual activity: Not on file   Other Topics Concern    Not on file   Social History Narrative    Not on file         ALLERGIES: Patient has no known allergies. Review of Systems   Constitutional: Positive for activity change, appetite change, fatigue and unexpected weight change. Negative for fever. HENT: Negative for sore throat and trouble swallowing. Eyes: Negative for visual disturbance. Respiratory: Negative for cough and shortness of breath. Cardiovascular: Negative for chest pain. Gastrointestinal: Negative for abdominal pain and blood in stool. Genitourinary: Negative for difficulty urinating. Musculoskeletal: Negative for gait problem. Skin: Negative for rash. Allergic/Immunologic: Negative for immunocompromised state. Neurological: Positive for syncope, weakness and light-headedness. Negative for seizures, numbness and headaches. Psychiatric/Behavioral: Positive for sleep disturbance. Vitals:    05/05/20 0215 05/05/20 0230 05/05/20 0245 05/05/20 0300   BP: 138/71 157/85 132/62 156/64   Pulse: 70 75 68 71   Resp: 16 18 17 19   Temp:       SpO2: 98% 96% 96% 97%   Weight:       Height:                Physical Exam  Vitals signs and nursing note reviewed. Constitutional:       General: He is not in acute distress. Appearance: He is not ill-appearing, toxic-appearing or diaphoretic. HENT:      Head: Normocephalic and atraumatic.       Right Ear: External ear normal.      Left Ear: External ear normal.      Nose: Nose normal.      Mouth/Throat: Pharynx: No oropharyngeal exudate. Eyes:      General: Scleral icterus present. Conjunctiva/sclera: Conjunctivae normal.   Neck:      Musculoskeletal: Normal range of motion. Cardiovascular:      Rate and Rhythm: Normal rate and regular rhythm. Heart sounds: Normal heart sounds. Pulmonary:      Effort: Pulmonary effort is normal. No respiratory distress. Breath sounds: Normal breath sounds. Abdominal:      General: Abdomen is flat. Palpations: Abdomen is soft. There is hepatomegaly. There is no splenomegaly or pulsatile mass. Tenderness: There is no abdominal tenderness. Musculoskeletal: Normal range of motion. Right lower leg: No edema. Left lower leg: No edema. Skin:     General: Skin is warm and dry. Capillary Refill: Capillary refill takes less than 2 seconds. Neurological:      Mental Status: He is alert and oriented to person, place, and time.    Psychiatric:         Behavior: Behavior normal.          MDM       Procedures  Vitals:  Patient Vitals for the past 12 hrs:   Temp Pulse Resp BP SpO2   05/05/20 0300 -- 71 19 156/64 97 %   05/05/20 0245 -- 68 17 132/62 96 %   05/05/20 0230 -- 75 18 157/85 96 %   05/05/20 0215 -- 70 16 138/71 98 %   05/05/20 0200 -- 67 20 147/71 98 %   05/05/20 0145 -- -- -- 151/77 98 %   05/05/20 0115 -- 73 21 159/63 99 %   05/05/20 0100 -- 70 22 165/74 98 %   05/05/20 0045 -- 71 21 167/80 99 %   05/05/20 0030 -- 75 20 166/57 99 %   05/05/20 0015 -- 72 18 162/82 99 %   05/04/20 2330 -- 84 21 127/71 95 %   05/04/20 2328 98.3 °F (36.8 °C) 86 19 152/74 95 %         Medications ordered:   Medications   sodium chloride 0.9 % bolus infusion 1,000 mL (0 mL IntraVENous IV Completed 5/5/20 0047)   famotidine (PF) (PEPCID) injection 20 mg (20 mg IntraVENous Given 5/4/20 2345)   ondansetron (ZOFRAN) injection 4 mg (4 mg IntraVENous Given 5/4/20 2345)   dextrose 5% and 0.9% NaCl infusion 1,000 mL (0 mL IntraVENous IV Completed 5/5/20 0964) thiamine (B-1) injection 200 mg (200 mg IntraMUSCular Given 5/5/20 0059)   therapeutic multivitamin [facility]) tablet 1 Tab (1 Tab Oral Given 1/6/97 0309)   folic acid (FOLVITE) tablet 1 mg (1 mg Oral Given 5/5/20 0052)   iopamidoL (ISOVUE 300) 61 % contrast injection 100 mL (100 mL IntraVENous Given 5/5/20 0147)         Lab findings:  Recent Results (from the past 12 hour(s))   EKG, 12 LEAD, INITIAL    Collection Time: 05/04/20 11:32 PM   Result Value Ref Range    Ventricular Rate 82 BPM    Atrial Rate 82 BPM    P-R Interval 150 ms    QRS Duration 108 ms    Q-T Interval 348 ms    QTC Calculation (Bezet) 406 ms    Calculated P Axis 61 degrees    Calculated R Axis 55 degrees    Calculated T Axis -112 degrees    Diagnosis       Sinus rhythm with frequent premature ventricular complexes  Left ventricular hypertrophy with repolarization abnormality ( Sokolow-Vega ,   Rupesh product )  Abnormal ECG  When compared with ECG of 03-DEC-2019 11:23,  premature ventricular complexes are now present  T wave inversion more evident in Inferior leads  T wave inversion now evident in Lateral leads     CBC WITH AUTOMATED DIFF    Collection Time: 05/04/20 11:48 PM   Result Value Ref Range    WBC 5.8 4.6 - 13.2 K/uL    RBC 4.97 4.70 - 5.50 M/uL    HGB 14.8 13.0 - 16.0 g/dL    HCT 41.5 36.0 - 48.0 %    MCV 83.5 74.0 - 97.0 FL    MCH 29.8 24.0 - 34.0 PG    MCHC 35.7 31.0 - 37.0 g/dL    RDW 15.8 (H) 11.6 - 14.5 %    PLATELET 242 823 - 535 K/uL    MPV 10.4 9.2 - 11.8 FL    NEUTROPHILS 56 40 - 73 %    LYMPHOCYTES 35 21 - 52 %    MONOCYTES 8 3 - 10 %    EOSINOPHILS 1 0 - 5 %    BASOPHILS 0 0 - 2 %    ABS. NEUTROPHILS 3.3 1.8 - 8.0 K/UL    ABS. LYMPHOCYTES 2.1 0.9 - 3.6 K/UL    ABS. MONOCYTES 0.5 0.05 - 1.2 K/UL    ABS. EOSINOPHILS 0.0 0.0 - 0.4 K/UL    ABS.  BASOPHILS 0.0 0.0 - 0.1 K/UL    DF AUTOMATED     PROTHROMBIN TIME + INR    Collection Time: 05/04/20 11:48 PM   Result Value Ref Range    Prothrombin time 13.5 11.5 - 15.2 sec    INR 1.1 0.8 - 1.2     METABOLIC PANEL, COMPREHENSIVE    Collection Time: 05/04/20 11:48 PM   Result Value Ref Range    Sodium 130 (L) 136 - 145 mmol/L    Potassium 4.2 3.5 - 5.5 mmol/L    Chloride 97 (L) 100 - 111 mmol/L    CO2 24 21 - 32 mmol/L    Anion gap 9 3.0 - 18 mmol/L    Glucose 92 74 - 99 mg/dL    BUN 17 7.0 - 18 MG/DL    Creatinine 1.48 (H) 0.6 - 1.3 MG/DL    BUN/Creatinine ratio 11 (L) 12 - 20      GFR est AA 57 (L) >60 ml/min/1.73m2    GFR est non-AA 47 (L) >60 ml/min/1.73m2    Calcium 8.5 8.5 - 10.1 MG/DL    Bilirubin, total 5.5 (H) 0.2 - 1.0 MG/DL    ALT (SGPT) 239 (H) 16 - 61 U/L    AST (SGOT) 375 (H) 10 - 38 U/L    Alk.  phosphatase 206 (H) 45 - 117 U/L    Protein, total 7.0 6.4 - 8.2 g/dL    Albumin 2.9 (L) 3.4 - 5.0 g/dL    Globulin 4.1 (H) 2.0 - 4.0 g/dL    A-G Ratio 0.7 (L) 0.8 - 1.7     ETHYL ALCOHOL    Collection Time: 05/04/20 11:48 PM   Result Value Ref Range    ALCOHOL(ETHYL),SERUM <3 0 - 3 MG/DL   MAGNESIUM    Collection Time: 05/04/20 11:48 PM   Result Value Ref Range    Magnesium 1.9 1.6 - 2.6 mg/dL   LIPASE    Collection Time: 05/04/20 11:48 PM   Result Value Ref Range    Lipase 192 73 - 393 U/L   CARDIAC PANEL,(CK, CKMB & TROPONIN)    Collection Time: 05/04/20 11:48 PM   Result Value Ref Range     39 - 308 U/L    CK - MB <1.0 <3.6 ng/ml    CK-MB Index  0.0 - 4.0 %     CALCULATION NOT PERFORMED WHEN RESULT IS BELOW LINEAR LIMIT    Troponin-I, QT 0.08 (H) 0.0 - 0.045 NG/ML   TSH 3RD GENERATION    Collection Time: 05/04/20 11:48 PM   Result Value Ref Range    TSH 1.84 0.36 - 3.74 uIU/mL   NT-PRO BNP    Collection Time: 05/04/20 11:48 PM   Result Value Ref Range    NT pro- 0 - 900 PG/ML   AMMONIA    Collection Time: 05/04/20 11:48 PM   Result Value Ref Range    Ammonia 36 (H) 11 - 32 UMOL/L   URINALYSIS W/ RFLX MICROSCOPIC    Collection Time: 05/05/20 12:35 AM   Result Value Ref Range    Color DARK YELLOW      Appearance CLEAR      Specific gravity 1.015 1.005 - 1.030      pH (UA) 6.0 5.0 - 8.0      Protein 30 (A) NEG mg/dL    Glucose Negative NEG mg/dL    Ketone TRACE (A) NEG mg/dL    Bilirubin SMALL (A) NEG      Blood Negative NEG      Urobilinogen 2.0 (H) 0.2 - 1.0 EU/dL    Nitrites Negative NEG      Leukocyte Esterase TRACE (A) NEG     URINE MICROSCOPIC ONLY    Collection Time: 05/05/20 12:35 AM   Result Value Ref Range    WBC 0 to 3 0 - 4 /hpf    RBC NONE 0 - 5 /hpf    Epithelial cells FEW 0 - 5 /lpf    Bacteria FEW (A) NEG /hpf    Mucus FEW (A) NEG /lpf   TROPONIN I    Collection Time: 05/05/20  2:45 AM   Result Value Ref Range    Troponin-I, QT 0.12 (H) 0.0 - 0.045 NG/ML       EKG interpretation by ED Physician:  Sinus rhythm with frequent PVC. LVH. T wave changes in the lateral leads. There is no acute ST changes. Rate 82    T wave inversions now more evident    Cardiac monitor: Normal rate with regular rhythm and occasional ectopy  Pulse ox: 95% room air, low end of normal    X-Ray, CT or other radiology findings or impressions:  XR CHEST PORT   Final Result   IMPRESSION:      Small right pleural effusion and atelectasis/scarring. CT CHEST ABD PELV W CONT    (Results Pending)   Slightly enlarged cardiac silhouette. There appears to be an effusion of the right base. Ct: No definite acute finding in the chest abdomen or pelvis. Moderate to severe emphysema. Stable scarring in the lung bases. Aortic ectasia and atherosclerosis. Diverticulosis. Hepatic steatosis. Progress notes, Consult notes or additional Procedure notes:   Patient with no obvious source of infection. Only slightly elevated troponin which did not change significantly upon repeat. Patient with abnormal liver tests which have worsened since the previous time they were checked. This may be result of alcohol abuse will send off hepatitis panel and recommend patient follow-up with GI for further evaluation.   Do not feel the patient requires admission at this time    I have discussed with patient and/or family/sig other the results, interpretation of any imaging if performed, suspected diagnosis and treatment plan to include instructions regarding the diagnoses listed to which understanding was expressed with all questions answered      Reevaluation of patient:   stable    Disposition:  Diagnosis:   1. Decreased appetite    2. Fatigue, unspecified type    3. Abnormal liver function test    4. Abdominal pain, generalized        Disposition: home    Follow-up Information     Follow up With Specialties Details Why Contact Info    Radha Garza MD Family Practice Schedule an appointment as soon as possible for a visit  [address] [facility] [address] [facility] [address]      Elena Roper MD Gastroenterology Schedule an appointment as soon as possible for a visit  [address] [facility]  [address] [place]  [address] [facility]       [facility] EMERGENCY DEPT Emergency Medicine  If symptoms worsen [address]  [phone]            Patient's Medications   Start Taking    DICYCLOMINE (BENTYL) 10 MG CAPSULE    Take 1 Cap by mouth four (4) times daily as needed for Abdominal Cramps (abd cramps) for up to 5 days. OMEPRAZOLE DELAYED RELEASE (PRILOSEC D/R) 20 MG TABLET    Take 1 Tab by mouth daily. ONDANSETRON (ZOFRAN ODT) 4 MG DISINTEGRATING TABLET    Take 1 Tab by mouth every eight (8) hours as needed for Nausea or Vomiting. Continue Taking    ASPIRIN DELAYED-RELEASE 81 MG TABLET    Take 81 mg by mouth daily. These Medications have changed    No medications on file   Stop Taking    CAMPHOR-EUCALYPTUS OIL-MENTHOL (VICKS VAPORUB) 4.8-1.2-2.6 % OINT    1 Actuation(s) by Apply Externally route three (3) times daily as needed for Cough or Other (congestion). DEXTROMETHORPHAN-GUAIFENESIN (CORICIDIN HBP)  MG CAP    Take 1 Cap by mouth two (2) times daily as needed for Cough.     DEXTROMETHORPHAN-GUAIFENESIN (ROBITUSSIN-DM)  MG/5 ML SYRUP    Take 10 mL by mouth every six (6) hours as needed for Cough. FLUTICASONE (FLONASE) 50 MCG/ACTUATION NASAL SPRAY    2 Sprays by Both Nostrils route daily. IBUPROFEN (MOTRIN) 600 MG TABLET    Take 1 Tab by mouth every six (6) hours as needed for Pain. LIDOCAINE (LIDODERM) 5 %    Apply patch to the affected area for 12 hours a day and remove for 12 hours a day. ONDANSETRON (ZOFRAN ODT) 4 MG DISINTEGRATING TABLET    Take 1-2 tablets every 6-8 hours as needed for nausea and vomiting.     OTHER

## 2020-08-04 ENCOUNTER — HOSPITAL ENCOUNTER (EMERGENCY)
Age: 70
Discharge: HOME | End: 2020-08-04
Attending: EMERGENCY MEDICINE | Admitting: EMERGENCY MEDICINE
Payer: MEDICARE

## 2020-08-04 VITALS
OXYGEN SATURATION: 95 % | RESPIRATION RATE: 16 BRPM | HEIGHT: 69 IN | TEMPERATURE: 98.1 F | BODY MASS INDEX: 26.66 KG/M2 | HEART RATE: 79 BPM | WEIGHT: 180 LBS | DIASTOLIC BLOOD PRESSURE: 69 MMHG | SYSTOLIC BLOOD PRESSURE: 138 MMHG

## 2020-08-04 DIAGNOSIS — M54.50 ACUTE BILATERAL LOW BACK PAIN WITHOUT SCIATICA: Primary | ICD-10-CM

## 2020-08-04 DIAGNOSIS — F10.929 ALCOHOLIC INTOXICATION WITH COMPLICATION (HCC): ICD-10-CM

## 2020-08-04 LAB
ALBUMIN SERPL-MCNC: 3.7 G/DL (ref 3.4–5)
ALBUMIN/GLOB SERPL: 0.8 {RATIO} (ref 0.8–1.7)
ALP SERPL-CCNC: 84 U/L (ref 45–117)
ALT SERPL-CCNC: 47 U/L (ref 16–61)
ANION GAP SERPL CALC-SCNC: 6 MMOL/L (ref 3–18)
AST SERPL-CCNC: 85 U/L (ref 10–38)
ATRIAL RATE: 71 BPM
BASOPHILS # BLD: 0 K/UL (ref 0–0.1)
BASOPHILS NFR BLD: 0 % (ref 0–2)
BILIRUB SERPL-MCNC: 0.9 MG/DL (ref 0.2–1)
BUN SERPL-MCNC: 13 MG/DL (ref 7–18)
BUN/CREAT SERPL: 13 (ref 12–20)
CALCIUM SERPL-MCNC: 8.4 MG/DL (ref 8.5–10.1)
CALCULATED P AXIS, ECG09: 66 DEGREES
CALCULATED R AXIS, ECG10: 51 DEGREES
CALCULATED T AXIS, ECG11: -102 DEGREES
CHLORIDE SERPL-SCNC: 105 MMOL/L (ref 100–111)
CO2 SERPL-SCNC: 26 MMOL/L (ref 21–32)
CREAT SERPL-MCNC: 1.02 MG/DL (ref 0.6–1.3)
DIAGNOSIS, 93000: NORMAL
DIFFERENTIAL METHOD BLD: ABNORMAL
EOSINOPHIL # BLD: 0 K/UL (ref 0–0.4)
EOSINOPHIL NFR BLD: 0 % (ref 0–5)
ERYTHROCYTE [DISTWIDTH] IN BLOOD BY AUTOMATED COUNT: 14 % (ref 11.6–14.5)
ETHANOL SERPL-MCNC: 223 MG/DL (ref 0–3)
GLOBULIN SER CALC-MCNC: 4.4 G/DL (ref 2–4)
GLUCOSE SERPL-MCNC: 109 MG/DL (ref 74–99)
HCT VFR BLD AUTO: 35.3 % (ref 36–48)
HGB BLD-MCNC: 11.9 G/DL (ref 13–16)
LIPASE SERPL-CCNC: 68 U/L (ref 73–393)
LYMPHOCYTES # BLD: 1.2 K/UL (ref 0.9–3.6)
LYMPHOCYTES NFR BLD: 12 % (ref 21–52)
MCH RBC QN AUTO: 30.9 PG (ref 24–34)
MCHC RBC AUTO-ENTMCNC: 33.7 G/DL (ref 31–37)
MCV RBC AUTO: 91.7 FL (ref 74–97)
MONOCYTES # BLD: 0.8 K/UL (ref 0.05–1.2)
MONOCYTES NFR BLD: 8 % (ref 3–10)
NEUTS SEG # BLD: 7.7 K/UL (ref 1.8–8)
NEUTS SEG NFR BLD: 80 % (ref 40–73)
P-R INTERVAL, ECG05: 174 MS
PLATELET # BLD AUTO: 262 K/UL (ref 135–420)
PMV BLD AUTO: 10.5 FL (ref 9.2–11.8)
POTASSIUM SERPL-SCNC: 4.5 MMOL/L (ref 3.5–5.5)
PROT SERPL-MCNC: 8.1 G/DL (ref 6.4–8.2)
Q-T INTERVAL, ECG07: 396 MS
QRS DURATION, ECG06: 110 MS
QTC CALCULATION (BEZET), ECG08: 430 MS
RBC # BLD AUTO: 3.85 M/UL (ref 4.7–5.5)
SODIUM SERPL-SCNC: 137 MMOL/L (ref 136–145)
TROPONIN I SERPL-MCNC: 0.03 NG/ML (ref 0–0.04)
VENTRICULAR RATE, ECG03: 71 BPM
WBC # BLD AUTO: 9.7 K/UL (ref 4.6–13.2)

## 2020-08-04 PROCEDURE — 80307 DRUG TEST PRSMV CHEM ANLYZR: CPT

## 2020-08-04 PROCEDURE — 96375 TX/PRO/DX INJ NEW DRUG ADDON: CPT

## 2020-08-04 PROCEDURE — 93005 ELECTROCARDIOGRAM TRACING: CPT

## 2020-08-04 PROCEDURE — 80053 COMPREHEN METABOLIC PANEL: CPT

## 2020-08-04 PROCEDURE — 99285 EMERGENCY DEPT VISIT HI MDM: CPT

## 2020-08-04 PROCEDURE — 74011250637 HC RX REV CODE- 250/637: Performed by: EMERGENCY MEDICINE

## 2020-08-04 PROCEDURE — 83690 ASSAY OF LIPASE: CPT

## 2020-08-04 PROCEDURE — 84484 ASSAY OF TROPONIN QUANT: CPT

## 2020-08-04 PROCEDURE — 74011250636 HC RX REV CODE- 250/636: Performed by: EMERGENCY MEDICINE

## 2020-08-04 PROCEDURE — 74011000250 HC RX REV CODE- 250: Performed by: EMERGENCY MEDICINE

## 2020-08-04 PROCEDURE — 85025 COMPLETE CBC W/AUTO DIFF WBC: CPT

## 2020-08-04 PROCEDURE — 96374 THER/PROPH/DIAG INJ IV PUSH: CPT

## 2020-08-04 RX ORDER — ONDANSETRON 2 MG/ML
4 INJECTION INTRAMUSCULAR; INTRAVENOUS
Status: COMPLETED | OUTPATIENT
Start: 2020-08-04 | End: 2020-08-04

## 2020-08-04 RX ORDER — ACETAMINOPHEN 500 MG
1000 TABLET ORAL
Status: COMPLETED | OUTPATIENT
Start: 2020-08-04 | End: 2020-08-04

## 2020-08-04 RX ORDER — METHOCARBAMOL 500 MG/1
500 TABLET, FILM COATED ORAL 4 TIMES DAILY
Qty: 20 TAB | Refills: 0 | Status: SHIPPED | OUTPATIENT
Start: 2020-08-04 | End: 2022-10-05 | Stop reason: ALTCHOICE

## 2020-08-04 RX ORDER — KETOROLAC TROMETHAMINE 15 MG/ML
15 INJECTION, SOLUTION INTRAMUSCULAR; INTRAVENOUS
Status: COMPLETED | OUTPATIENT
Start: 2020-08-04 | End: 2020-08-04

## 2020-08-04 RX ORDER — MORPHINE SULFATE 4 MG/ML
4 INJECTION, SOLUTION INTRAMUSCULAR; INTRAVENOUS
Status: COMPLETED | OUTPATIENT
Start: 2020-08-04 | End: 2020-08-04

## 2020-08-04 RX ORDER — ACETAMINOPHEN 500 MG
1000 TABLET ORAL
Qty: 50 TAB | Refills: 0 | OUTPATIENT
Start: 2020-08-04 | End: 2020-08-05

## 2020-08-04 RX ORDER — LIDOCAINE 50 MG/G
2 PATCH TOPICAL EVERY 12 HOURS
Qty: 30 PATCH | Refills: 0 | Status: SHIPPED | OUTPATIENT
Start: 2020-08-04 | End: 2022-10-05 | Stop reason: ALTCHOICE

## 2020-08-04 RX ORDER — NAPROXEN 500 MG/1
500 TABLET ORAL 2 TIMES DAILY WITH MEALS
Qty: 14 TAB | Refills: 0 | Status: SHIPPED | OUTPATIENT
Start: 2020-08-04 | End: 2020-08-11

## 2020-08-04 RX ORDER — LIDOCAINE 4 G/100G
2 PATCH TOPICAL
Status: DISCONTINUED | OUTPATIENT
Start: 2020-08-04 | End: 2020-08-04 | Stop reason: HOSPADM

## 2020-08-04 RX ADMIN — MORPHINE SULFATE 4 MG: 4 INJECTION, SOLUTION INTRAMUSCULAR; INTRAVENOUS at 05:58

## 2020-08-04 RX ADMIN — ONDANSETRON 4 MG: 2 INJECTION INTRAMUSCULAR; INTRAVENOUS at 05:58

## 2020-08-04 RX ADMIN — ACETAMINOPHEN 1000 MG: 500 TABLET, FILM COATED ORAL at 05:57

## 2020-08-04 RX ADMIN — KETOROLAC TROMETHAMINE 15 MG: 15 INJECTION, SOLUTION INTRAMUSCULAR; INTRAVENOUS at 05:58

## 2020-08-04 NOTE — ED PROVIDER NOTES
Steven Ryder is a 71 y.o. male with noted past medical history of complaints of abrupt onset of severe lower mid back pain that also occurs in both flanks that started after he got up to use the restroom. Patient to call 911 because the pain got so bad where he felt like he could not walk. Patient had any abdominal pain, sweats, numbness or tingling in his legs, saddle anesthesia, incontinence or loss of bowel or bladder dysfunction. He said no recent fall or other trauma. No history of significant back issues or surgeries in the past.  Denies any chest pain or shortness of breath or fever. Pain is worse with any movement. He did not take anything prior to arrival    The history is provided by the patient.         Past Medical History:   Diagnosis Date    ETOH abuse     Glaucoma        Past Surgical History:   Procedure Laterality Date    HX HEENT      HX WISDOM TEETH EXTRACTION           Family History:   Problem Relation Age of Onset    Alcohol abuse Other        Social History     Socioeconomic History    Marital status: SINGLE     Spouse name: Not on file    Number of children: Not on file    Years of education: Not on file    Highest education level: Not on file   Occupational History    Not on file   Social Needs    Financial resource strain: Not on file    Food insecurity     Worry: Not on file     Inability: Not on file    Transportation needs     Medical: Not on file     Non-medical: Not on file   Tobacco Use    Smoking status: Former Smoker    Smokeless tobacco: Never Used    Tobacco comment: wearing a patch   Substance and Sexual Activity    Alcohol use: Yes     Comment: rarely    Drug use: No    Sexual activity: Not on file   Lifestyle    Physical activity     Days per week: Not on file     Minutes per session: Not on file    Stress: Not on file   Relationships    Social connections     Talks on phone: Not on file     Gets together: Not on file     Attends Judaism service: Not on file     Active member of club or organization: Not on file     Attends meetings of clubs or organizations: Not on file     Relationship status: Not on file    Intimate partner violence     Fear of current or ex partner: Not on file     Emotionally abused: Not on file     Physically abused: Not on file     Forced sexual activity: Not on file   Other Topics Concern    Not on file   Social History Narrative    Not on file         ALLERGIES: Patient has no known allergies. Review of Systems   Constitutional: Negative for fever. HENT: Negative for sore throat. Eyes: Negative for visual disturbance. Respiratory: Negative for shortness of breath. Cardiovascular: Negative for chest pain. Gastrointestinal: Negative for abdominal pain. Genitourinary: Negative for difficulty urinating. Musculoskeletal: Positive for back pain and gait problem. Skin: Negative for rash. Neurological: Negative for syncope, weakness and numbness. Psychiatric/Behavioral: Positive for sleep disturbance. Vitals:    08/04/20 0430 08/04/20 0434 08/04/20 0455 08/04/20 0600   BP: 158/68  158/68 112/62   Pulse:  70     Resp:  20  20   Temp:  98.1 °F (36.7 °C)     SpO2: 98% 94%  94%   Weight:  81.6 kg (180 lb)     Height:  5' 9\" (1.753 m)              Physical Exam  Vitals signs and nursing note reviewed. Constitutional:       General: He is in acute distress. Appearance: He is obese. He is not ill-appearing, toxic-appearing or diaphoretic. HENT:      Head: Normocephalic and atraumatic. Right Ear: External ear normal.      Left Ear: External ear normal.      Nose: Nose normal.      Mouth/Throat:      Pharynx: No oropharyngeal exudate. Eyes:      Conjunctiva/sclera: Conjunctivae normal.   Neck:      Musculoskeletal: Normal range of motion. Cardiovascular:      Rate and Rhythm: Normal rate and regular rhythm. Pulses:           Radial pulses are 2+ on the right side and 2+ on the left side. Dorsalis pedis pulses are 2+ on the right side and 2+ on the left side. Posterior tibial pulses are 2+ on the right side and 2+ on the left side. Heart sounds: Normal heart sounds. Pulmonary:      Effort: Pulmonary effort is normal. No respiratory distress. Breath sounds: Normal breath sounds. Abdominal:      General: Abdomen is protuberant. There is no distension or abdominal bruit. Palpations: Abdomen is soft. There is no pulsatile mass. Tenderness: There is no abdominal tenderness. Musculoskeletal:         General: No deformity. Lumbar back: He exhibits decreased range of motion, tenderness, pain and spasm. He exhibits no bony tenderness, no swelling and no edema. Back:       Right lower leg: No edema. Left lower leg: No edema. Skin:     General: Skin is warm and dry. Capillary Refill: Capillary refill takes less than 2 seconds. Neurological:      Mental Status: He is alert and oriented to person, place, and time. Comments: Normal tone in the lower extremities along with no numbness or tingling is noted. No saddle anesthesia. No foot drop. Psychiatric:         Behavior: Behavior normal.          MDM  Number of Diagnoses or Management Options  Acute bilateral low back pain without sciatica:   Alcoholic intoxication with complication [facility]):   Diagnosis management comments: 7:37 AM  I received pt in sign-out from Dr. Rae Aldrich, pending labs for low back pain. ETOH markedly elevated at 223 but work-up otherwise unremarkable. Pt doing ok, states back feels sore. On my exam, no spinal tenderness and pt able to stand without much difficulty. Pt has some pain with changing position. Presentation seems most consistent with low back strain. Discussed results and poc for dc home, symptom management, follow-up, return precautions.          Procedures    Vitals:  Patient Vitals for the past 12 hrs:   Temp Pulse Resp BP SpO2   08/04/20 0600 -- -- 20 112/62 94 %   08/04/20 0455 -- -- -- 158/68 --   08/04/20 0434 98.1 °F (36.7 °C) 70 20 -- 94 %   08/04/20 0430 -- -- -- 158/68 98 %         Medications ordered:   Medications   lidocaine 4 % patch 2 Patch (2 Patches TransDERmal Apply Patch 8/4/20 0544)   morphine injection 4 mg (4 mg IntraVENous Given 8/4/20 0558)   ondansetron (ZOFRAN) injection 4 mg (4 mg IntraVENous Given 8/4/20 0558)   ketorolac (TORADOL) injection 15 mg (15 mg IntraVENous Given 8/4/20 0558)   acetaminophen (TYLENOL) tablet 1,000 mg (1,000 mg Oral Given 8/4/20 0557)         Lab findings:  Recent Results (from the past 12 hour(s))   EKG, 12 LEAD, INITIAL    Collection Time: 08/04/20  4:52 AM   Result Value Ref Range    Ventricular Rate 71 BPM    Atrial Rate 71 BPM    P-R Interval 174 ms    QRS Duration 110 ms    Q-T Interval 396 ms    QTC Calculation (Bezet) 430 ms    Calculated P Axis 66 degrees    Calculated R Axis 51 degrees    Calculated T Axis -102 degrees    Diagnosis       Normal sinus rhythm  Left ventricular hypertrophy with repolarization abnormality ( Sokolow-Vega )  Abnormal ECG  When compared with ECG of 04-MAY-2020 23:32,  premature ventricular complexes are no longer present  Inverted T waves have replaced nonspecific T wave abnormality in Anterior   leads     CBC WITH AUTOMATED DIFF    Collection Time: 08/04/20  5:50 AM   Result Value Ref Range    WBC 9.7 4.6 - 13.2 K/uL    RBC 3.85 (L) 4.70 - 5.50 M/uL    HGB 11.9 (L) 13.0 - 16.0 g/dL    HCT 35.3 (L) 36.0 - 48.0 %    MCV 91.7 74.0 - 97.0 FL    MCH 30.9 24.0 - 34.0 PG    MCHC 33.7 31.0 - 37.0 g/dL    RDW 14.0 11.6 - 14.5 %    PLATELET 658 469 - 465 K/uL    MPV 10.5 9.2 - 11.8 FL    NEUTROPHILS 80 (H) 40 - 73 %    LYMPHOCYTES 12 (L) 21 - 52 %    MONOCYTES 8 3 - 10 %    EOSINOPHILS 0 0 - 5 %    BASOPHILS 0 0 - 2 %    ABS. NEUTROPHILS 7.7 1.8 - 8.0 K/UL    ABS. LYMPHOCYTES 1.2 0.9 - 3.6 K/UL    ABS. MONOCYTES 0.8 0.05 - 1.2 K/UL    ABS. EOSINOPHILS 0.0 0.0 - 0.4 K/UL    ABS.  BASOPHILS 0.0 0.0 - 0.1 K/UL    DF AUTOMATED     METABOLIC PANEL, COMPREHENSIVE    Collection Time: 08/04/20  5:50 AM   Result Value Ref Range    Sodium 137 136 - 145 mmol/L    Potassium 4.5 3.5 - 5.5 mmol/L    Chloride 105 100 - 111 mmol/L    CO2 26 21 - 32 mmol/L    Anion gap 6 3.0 - 18 mmol/L    Glucose 109 (H) 74 - 99 mg/dL    BUN 13 7.0 - 18 MG/DL    Creatinine 1.02 0.6 - 1.3 MG/DL    BUN/Creatinine ratio 13 12 - 20      GFR est AA >60 >60 ml/min/1.73m2    GFR est non-AA >60 >60 ml/min/1.73m2    Calcium 8.4 (L) 8.5 - 10.1 MG/DL    Bilirubin, total 0.9 0.2 - 1.0 MG/DL    ALT (SGPT) 47 16 - 61 U/L    AST (SGOT) 85 (H) 10 - 38 U/L    Alk. phosphatase 84 45 - 117 U/L    Protein, total 8.1 6.4 - 8.2 g/dL    Albumin 3.7 3.4 - 5.0 g/dL    Globulin 4.4 (H) 2.0 - 4.0 g/dL    A-G Ratio 0.8 0.8 - 1.7     LIPASE    Collection Time: 08/04/20  5:50 AM   Result Value Ref Range    Lipase 68 (L) 73 - 393 U/L   TROPONIN I    Collection Time: 08/04/20  5:50 AM   Result Value Ref Range    Troponin-I, QT 0.03 0.0 - 0.045 NG/ML   ETHYL ALCOHOL    Collection Time: 08/04/20  5:50 AM   Result Value Ref Range    ALCOHOL(ETHYL),SERUM 223 (H) 0 - 3 MG/DL       EKG interpretation by ED Physician:  Normal sinus rhythm with no acute ST changes. Inverted T waves have replaced the nonspecific T wave changes were present previously. Rate 71      Cardiac monitor: Normal rate with regular rhythm and no ectopy  Pulse ox: 94% room air, borderline    X-Ray, CT or other radiology findings or impressions:  No orders to display       Progress notes, Consult notes or additional Procedure notes:   Suspect musculoskeletal related back pain. Do not feel imaging is required at this point. There is some delay in getting his lab work and medications done at this time. We will turn over to Dr. Araceli Hardin to follow-up on labs and if no significant abnormalities and pain is improved patient can be discharged home.   I have left prescriptions for this    Reevaluation of patient:   stable    Disposition:  Diagnosis:   1. Acute bilateral low back pain without sciatica    2. Alcoholic intoxication with complication [facility])        Disposition: Discharged    Follow-up Information     Follow up With Specialties Details Why Contact Info    Alex Hernandez MD Family Medicine Schedule an appointment as soon as possible for a visit  [address], [address] [facility] [address] EMERGENCY DEPT Emergency Medicine  If symptoms worsen [address] [place]  [phone]            Patient's Medications   Start Taking    ACETAMINOPHEN (TYLENOL EXTRA STRENGTH) 500 MG TABLET    Take 2 Tabs by mouth every six (6) hours as needed for Pain. LIDOCAINE (LIDODERM) 5 %    2 Patches by TransDERmal route every twelve (12) hours. Apply patch to the affected area for 12 hours a day and remove for 12 hours a day. METHOCARBAMOL (ROBAXIN) 500 MG TABLET    Take 1 Tab by mouth four (4) times daily. NAPROXEN (NAPROSYN) 500 MG TABLET    Take 1 Tab by mouth two (2) times daily (with meals) for 7 days. Continue Taking    ASPIRIN DELAYED-RELEASE 81 MG TABLET    Take 81 mg by mouth daily. OMEPRAZOLE DELAYED RELEASE (PRILOSEC D/R) 20 MG TABLET    Take 1 Tab by mouth daily. ONDANSETRON (ZOFRAN ODT) 4 MG DISINTEGRATING TABLET    Take 1 Tab by mouth every eight (8) hours as needed for Nausea or Vomiting.    These Medications have changed    No medications on file   Stop Taking    No medications on file

## 2020-08-04 NOTE — ED NOTES
I have reviewed discharge instructions with the patient. The spouse verbalized understanding. Four Rx given. VSS, pt with no signs of distress at this time. Calling ride and will be wheeled out to lobby.

## 2020-08-04 NOTE — DISCHARGE INSTRUCTIONS
Patient Education        Back Pain: Care Instructions  Your Care Instructions     Back pain has many possible causes. It is often related to problems with muscles and ligaments of the back. It may also be related to problems with the nerves, discs, or bones of the back. Moving, lifting, standing, sitting, or sleeping in an awkward way can strain the back. Sometimes you don't notice the injury until later. Arthritis is another common cause of back pain. Although it may hurt a lot, back pain usually improves on its own within several weeks. Most people recover in 12 weeks or less. Using good home treatment and being careful not to stress your back can help you feel better sooner. Follow-up care is a key part of your treatment and safety. Be sure to make and go to all appointments, and call your doctor if you are having problems. It's also a good idea to know your test results and keep a list of the medicines you take. How can you care for yourself at home? · Sit or lie in positions that are most comfortable and reduce your pain. Try one of these positions when you lie down:  ? Lie on your back with your knees bent and supported by large pillows. ? Lie on the floor with your legs on the seat of a sofa or chair. ? Lie on your side with your knees and hips bent and a pillow between your legs. ? Lie on your stomach if it does not make pain worse. · Do not sit up in bed, and avoid soft couches and twisted positions. Bed rest can help relieve pain at first, but it delays healing. Avoid bed rest after the first day of back pain. · Change positions every 30 minutes. If you must sit for long periods of time, take breaks from sitting. Get up and walk around, or lie in a comfortable position. · Try using a heating pad on a low or medium setting for 15 to 20 minutes every 2 or 3 hours. Try a warm shower in place of one session with the heating pad. · You can also try an ice pack for 10 to 15 minutes every 2 to 3 hours. Put a thin cloth between the ice pack and your skin. · Take pain medicines exactly as directed. ? If the doctor gave you a prescription medicine for pain, take it as prescribed. ? If you are not taking a prescription pain medicine, ask your doctor if you can take an over-the-counter medicine. · Take short walks several times a day. You can start with 5 to 10 minutes, 3 or 4 times a day, and work up to longer walks. Walk on level surfaces and avoid hills and stairs until your back is better. · Return to work and other activities as soon as you can. Continued rest without activity is usually not good for your back. · To prevent future back pain, do exercises to stretch and strengthen your back and stomach. Learn how to use good posture, safe lifting techniques, and proper body mechanics. When should you call for help? Call your doctor now or seek immediate medical care if:  · You have new or worsening numbness in your legs. · You have new or worsening weakness in your legs. (This could make it hard to stand up.)  · You lose control of your bladder or bowels. Watch closely for changes in your health, and be sure to contact your doctor if:  · You have a fever, lose weight, or don't feel well. · You do not get better as expected. Where can you learn more? Go to http://dagoberto-leydi.info/  Enter I594 in the search box to learn more about \"Back Pain: Care Instructions. \"  Current as of: March 2, 2020               Content Version: 12.5  © 9083-3664 [employer]. Care instructions adapted under license by [employer] (which disclaims liability or warranty for this information). If you have questions about a medical condition or this instruction, always ask your healthcare professional. [place] [address] any warranty or liability for your use of this information.          Patient Education        Learning About Relief for Back Pain  What is back tension and strain? Back strain happens when you overstretch, or pull, a muscle in your back. You may hurt your back in an accident or when you exercise or lift something. Most back pain will get better with rest and time. You can take care of yourself at home to help your back heal.  What can you do first to relieve back pain? When you first feel back pain, try these steps:  · Walk. Take a short walk (10 to 20 minutes) on a level surface (no slopes, hills, or stairs) every 2 to 3 hours. Walk only distances you can manage without pain, especially leg pain. · Relax. Find a comfortable position for rest. Some people are comfortable on the floor or a medium-firm bed with a small pillow under their head and another under their knees. Some people prefer to lie on their side with a pillow between their knees. Don't stay in one position for too long. · Try heat or ice. Try using a heating pad on a low or medium setting, or take a warm shower, for 15 to 20 minutes every 2 to 3 hours. Or you can buy single-use heat wraps that last up to 8 hours. You can also try an ice pack for 10 to 15 minutes every 2 to 3 hours. You can use an ice pack or a bag of frozen vegetables wrapped in a thin towel. There is not strong evidence that either heat or ice will help, but you can try them to see if they help. You may also want to try switching between heat and cold. · Take pain medicine exactly as directed. ¨ If the doctor gave you a prescription medicine for pain, take it as prescribed. ¨ If you are not taking a prescription pain medicine, ask your doctor if you can take an over-the-counter medicine. What else can you do? · Stretch and exercise. Exercises that increase flexibility may relieve your pain and make it easier for your muscles to keep your spine in a good, neutral position. And don't forget to keep walking. · Do self-massage. You can use self-massage to unwind after work or school or to energize yourself in the morning. You can easily massage your feet, hands, or neck. Self-massage works best if you are in comfortable clothes and are sitting or lying in a comfortable position. Use oil or lotion to massage bare skin. · Reduce stress. Back pain can lead to a vicious [place]: Distress about the pain tenses the muscles in your back, which in turn causes more pain. Learn how to relax your mind and your muscles to lower your stress. Where can you learn more? Go to http://dagoberto-leydi.info/. Enter U209 in the search box to learn more about \"Learning About Relief for Back Pain. \"  Current as of: March 21, 2017  Content Version: 11.5  © 2819-7883 [employer]. Care instructions adapted under license by [employer] (which disclaims liability or warranty for this information). If you have questions about a medical condition or this instruction, always ask your healthcare professional. [address] any warranty or liability for your use of this information.

## 2020-08-04 NOTE — ED NOTES
Pt instructed to call ride as plans are being made to d/c to home. VSS. Pt sleeping on and off in bed.

## 2020-08-04 NOTE — ED NOTES
Report received from [facility]. Plans being made to d/c to home. Pt sleeping in bed at this time. Will continue to monitor.

## 2020-08-04 NOTE — ED TRIAGE NOTES
Patient was at home in bed, he got up to use the bathroom and realized he could hardly get out of bed due to pain. Denies numbing or tingling and no incontinence.

## 2020-08-05 ENCOUNTER — HOSPITAL ENCOUNTER (EMERGENCY)
Age: 70
Discharge: HOME | End: 2020-08-05
Attending: EMERGENCY MEDICINE
Payer: MEDICARE

## 2020-08-05 ENCOUNTER — APPOINTMENT (OUTPATIENT)
Dept: GENERAL RADIOLOGY | Age: 70
End: 2020-08-05
Attending: EMERGENCY MEDICINE
Payer: MEDICARE

## 2020-08-05 ENCOUNTER — APPOINTMENT (OUTPATIENT)
Dept: CT IMAGING | Age: 70
End: 2020-08-05
Attending: EMERGENCY MEDICINE
Payer: MEDICARE

## 2020-08-05 VITALS
DIASTOLIC BLOOD PRESSURE: 77 MMHG | RESPIRATION RATE: 29 BRPM | HEART RATE: 89 BPM | SYSTOLIC BLOOD PRESSURE: 158 MMHG | TEMPERATURE: 98.8 F | OXYGEN SATURATION: 100 %

## 2020-08-05 DIAGNOSIS — S32.020A COMPRESSION FRACTURE OF L2 VERTEBRA, INITIAL ENCOUNTER (HCC): ICD-10-CM

## 2020-08-05 DIAGNOSIS — R06.02 SHORTNESS OF BREATH: Primary | ICD-10-CM

## 2020-08-05 DIAGNOSIS — M54.50 ACUTE BILATERAL LOW BACK PAIN WITHOUT SCIATICA: ICD-10-CM

## 2020-08-05 LAB
ALBUMIN SERPL-MCNC: 3.6 G/DL (ref 3.4–5)
ALBUMIN/GLOB SERPL: 0.8 {RATIO} (ref 0.8–1.7)
ALP SERPL-CCNC: 106 U/L (ref 45–117)
ALT SERPL-CCNC: 51 U/L (ref 16–61)
ANION GAP SERPL CALC-SCNC: 6 MMOL/L (ref 3–18)
APPEARANCE UR: CLEAR
AST SERPL-CCNC: 92 U/L (ref 10–38)
BASOPHILS # BLD: 0 K/UL (ref 0–0.1)
BASOPHILS NFR BLD: 0 % (ref 0–2)
BILIRUB SERPL-MCNC: 1.8 MG/DL (ref 0.2–1)
BILIRUB UR QL: NEGATIVE
BNP SERPL-MCNC: 2456 PG/ML (ref 0–900)
BUN SERPL-MCNC: 9 MG/DL (ref 7–18)
BUN/CREAT SERPL: 11 (ref 12–20)
CALCIUM SERPL-MCNC: 8.2 MG/DL (ref 8.5–10.1)
CHLORIDE SERPL-SCNC: 94 MMOL/L (ref 100–111)
CO2 SERPL-SCNC: 26 MMOL/L (ref 21–32)
COLOR UR: YELLOW
CREAT SERPL-MCNC: 0.84 MG/DL (ref 0.6–1.3)
DIFFERENTIAL METHOD BLD: ABNORMAL
EOSINOPHIL # BLD: 0 K/UL (ref 0–0.4)
EOSINOPHIL NFR BLD: 0 % (ref 0–5)
ERYTHROCYTE [DISTWIDTH] IN BLOOD BY AUTOMATED COUNT: 13 % (ref 11.6–14.5)
GLOBULIN SER CALC-MCNC: 4.6 G/DL (ref 2–4)
GLUCOSE SERPL-MCNC: 122 MG/DL (ref 74–99)
GLUCOSE UR STRIP.AUTO-MCNC: NEGATIVE MG/DL
HCT VFR BLD AUTO: 33.9 % (ref 36–48)
HGB BLD-MCNC: 11.7 G/DL (ref 13–16)
HGB UR QL STRIP: NEGATIVE
KETONES UR QL STRIP.AUTO: NEGATIVE MG/DL
LACTATE BLD-SCNC: 1.52 MMOL/L (ref 0.4–2)
LEUKOCYTE ESTERASE UR QL STRIP.AUTO: NEGATIVE
LYMPHOCYTES # BLD: 1.4 K/UL (ref 0.9–3.6)
LYMPHOCYTES NFR BLD: 11 % (ref 21–52)
MCH RBC QN AUTO: 30.7 PG (ref 24–34)
MCHC RBC AUTO-ENTMCNC: 34.5 G/DL (ref 31–37)
MCV RBC AUTO: 89 FL (ref 74–97)
MONOCYTES # BLD: 1.3 K/UL (ref 0.05–1.2)
MONOCYTES NFR BLD: 10 % (ref 3–10)
NEUTS SEG # BLD: 10.2 K/UL (ref 1.8–8)
NEUTS SEG NFR BLD: 79 % (ref 40–73)
NITRITE UR QL STRIP.AUTO: NEGATIVE
PH UR STRIP: 7 [PH] (ref 5–8)
PLATELET # BLD AUTO: 194 K/UL (ref 135–420)
PMV BLD AUTO: 10.2 FL (ref 9.2–11.8)
POTASSIUM SERPL-SCNC: 3.8 MMOL/L (ref 3.5–5.5)
PROT SERPL-MCNC: 8.2 G/DL (ref 6.4–8.2)
PROT UR STRIP-MCNC: NEGATIVE MG/DL
RBC # BLD AUTO: 3.81 M/UL (ref 4.7–5.5)
SODIUM SERPL-SCNC: 126 MMOL/L (ref 136–145)
SP GR UR REFRACTOMETRY: 1.02 (ref 1–1.03)
TROPONIN I SERPL-MCNC: 0.04 NG/ML (ref 0–0.04)
UROBILINOGEN UR QL STRIP.AUTO: 0.2 EU/DL (ref 0.2–1)
WBC # BLD AUTO: 12.9 K/UL (ref 4.6–13.2)

## 2020-08-05 PROCEDURE — 71275 CT ANGIOGRAPHY CHEST: CPT

## 2020-08-05 PROCEDURE — 74011000250 HC RX REV CODE- 250: Performed by: EMERGENCY MEDICINE

## 2020-08-05 PROCEDURE — 83880 ASSAY OF NATRIURETIC PEPTIDE: CPT

## 2020-08-05 PROCEDURE — 74011250636 HC RX REV CODE- 250/636: Performed by: EMERGENCY MEDICINE

## 2020-08-05 PROCEDURE — 93005 ELECTROCARDIOGRAM TRACING: CPT

## 2020-08-05 PROCEDURE — 83605 ASSAY OF LACTIC ACID: CPT

## 2020-08-05 PROCEDURE — 74011000258 HC RX REV CODE- 258: Performed by: EMERGENCY MEDICINE

## 2020-08-05 PROCEDURE — 74011250637 HC RX REV CODE- 250/637: Performed by: EMERGENCY MEDICINE

## 2020-08-05 PROCEDURE — 87040 BLOOD CULTURE FOR BACTERIA: CPT

## 2020-08-05 PROCEDURE — 80053 COMPREHEN METABOLIC PANEL: CPT

## 2020-08-05 PROCEDURE — 99285 EMERGENCY DEPT VISIT HI MDM: CPT

## 2020-08-05 PROCEDURE — 71045 X-RAY EXAM CHEST 1 VIEW: CPT

## 2020-08-05 PROCEDURE — 96365 THER/PROPH/DIAG IV INF INIT: CPT

## 2020-08-05 PROCEDURE — 85025 COMPLETE CBC W/AUTO DIFF WBC: CPT

## 2020-08-05 PROCEDURE — 96374 THER/PROPH/DIAG INJ IV PUSH: CPT

## 2020-08-05 PROCEDURE — 81003 URINALYSIS AUTO W/O SCOPE: CPT

## 2020-08-05 PROCEDURE — 74011636320 HC RX REV CODE- 636/320: Performed by: EMERGENCY MEDICINE

## 2020-08-05 PROCEDURE — 84484 ASSAY OF TROPONIN QUANT: CPT

## 2020-08-05 PROCEDURE — 94640 AIRWAY INHALATION TREATMENT: CPT

## 2020-08-05 PROCEDURE — 74177 CT ABD & PELVIS W/CONTRAST: CPT

## 2020-08-05 RX ORDER — HYDROCODONE BITARTRATE AND ACETAMINOPHEN 5; 325 MG/1; MG/1
1 TABLET ORAL
Qty: 15 TAB | Refills: 0 | Status: SHIPPED | OUTPATIENT
Start: 2020-08-05 | End: 2020-08-10

## 2020-08-05 RX ORDER — SODIUM CHLORIDE 0.9 % (FLUSH) 0.9 %
5-10 SYRINGE (ML) INJECTION AS NEEDED
Status: DISCONTINUED | OUTPATIENT
Start: 2020-08-05 | End: 2020-08-06 | Stop reason: HOSPADM

## 2020-08-05 RX ORDER — ALBUTEROL SULFATE 0.83 MG/ML
5 SOLUTION RESPIRATORY (INHALATION)
Status: COMPLETED | OUTPATIENT
Start: 2020-08-05 | End: 2020-08-05

## 2020-08-05 RX ORDER — LIDOCAINE 4 G/100G
2 PATCH TOPICAL
Status: DISCONTINUED | OUTPATIENT
Start: 2020-08-05 | End: 2020-08-06 | Stop reason: HOSPADM

## 2020-08-05 RX ORDER — NALOXONE HYDROCHLORIDE 0.4 MG/ML
0.4 INJECTION, SOLUTION INTRAMUSCULAR; INTRAVENOUS; SUBCUTANEOUS AS NEEDED
Status: DISCONTINUED | OUTPATIENT
Start: 2020-08-05 | End: 2020-08-06 | Stop reason: HOSPADM

## 2020-08-05 RX ORDER — SODIUM CHLORIDE 9 MG/ML
100 INJECTION, SOLUTION INTRAVENOUS ONCE
Status: COMPLETED | OUTPATIENT
Start: 2020-08-05 | End: 2020-08-05

## 2020-08-05 RX ORDER — MORPHINE SULFATE 2 MG/ML
4 INJECTION, SOLUTION INTRAMUSCULAR; INTRAVENOUS ONCE
Status: COMPLETED | OUTPATIENT
Start: 2020-08-05 | End: 2020-08-05

## 2020-08-05 RX ORDER — HYDROCODONE BITARTRATE AND ACETAMINOPHEN 5; 325 MG/1; MG/1
1 TABLET ORAL
Status: COMPLETED | OUTPATIENT
Start: 2020-08-05 | End: 2020-08-05

## 2020-08-05 RX ADMIN — IOPAMIDOL 100 ML: 755 INJECTION, SOLUTION INTRAVENOUS at 19:44

## 2020-08-05 RX ADMIN — MORPHINE SULFATE 4 MG: 2 INJECTION, SOLUTION INTRAMUSCULAR; INTRAVENOUS at 20:06

## 2020-08-05 RX ADMIN — ALBUTEROL SULFATE 5 MG: 2.5 SOLUTION RESPIRATORY (INHALATION) at 17:44

## 2020-08-05 RX ADMIN — AZITHROMYCIN MONOHYDRATE 500 MG: 500 INJECTION, POWDER, LYOPHILIZED, FOR SOLUTION INTRAVENOUS at 20:05

## 2020-08-05 RX ADMIN — CEFTRIAXONE 2 G: 2 INJECTION, POWDER, FOR SOLUTION INTRAMUSCULAR; INTRAVENOUS at 20:06

## 2020-08-05 RX ADMIN — HYDROCODONE BITARTRATE AND ACETAMINOPHEN 1 TABLET: 5; 325 TABLET ORAL at 22:17

## 2020-08-05 RX ADMIN — SODIUM CHLORIDE 100 ML: 900 INJECTION, SOLUTION INTRAVENOUS at 19:45

## 2020-08-05 NOTE — ED TRIAGE NOTES
Pt arrives to ed via ems for increased sob over the last few days. Pt states hx untreated COPD and HTN. No home meds.   Per EMS- pt received one albuterol and one combi tx prior to arrival.

## 2020-08-05 NOTE — ED PROVIDER NOTES
[facility]  EMERGENCY DEPARTMENT HISTORY AND PHYSICAL EXAM       Date: 8/5/2020   Patient Name: Yelena Conley   YOB: 1950  Medical Record Number: [id number]    HISTORY OF PRESENTING ILLNESS:     Yelena Conley is a 71 y.o. male presenting with the noted PMH to the ED c/o shortness of breath. Patient states started this morning. With shortness of breath. Progressively getting worse today. Denies any fevers or chills. He states he has had some sweats today. He also has had coughing. No sore throat. No nasal congestion. He states he is got pain in his lower back where he fell couple days ago. Achy pain. With no increasing or decreasing factors. Denies abdominal pain. Denies any urine or bowel changes. Denies hitting his head or loss of consciousness. Denies any headache. Per EMS they state that his O2 sats were 92 in the field. They did give him 5 mg of albuterol 0.5 Atrovent. Patient states he is feeling better after treatment. But still continues to have wheezing. He has history of COPD. Rest of systems reviewed and negative.       Primary Care Provider: Bruna Nieto MD   Specialist:    Past Medical History:   Past Medical History:   Diagnosis Date    COPD (chronic obstructive pulmonary disease) ([address]     ETOH abuse     Glaucoma     HTN (hypertension)         Past Surgical History:   Past Surgical History:   Procedure Laterality Date    HX HEENT      HX WISDOM TEETH EXTRACTION          Social History:   Social History     Tobacco Use    Smoking status: Former Smoker    Smokeless tobacco: Never Used    Tobacco comment: wearing a patch   Substance Use Topics    Alcohol use: Yes     Comment: rarely    Drug use: No        Allergies:   No Known Allergies     REVIEW OF SYSTEMS:  Review of Systems      PHYSICAL EXAM:  Vitals:    08/05/20 1706 08/05/20 1715 08/05/20 1730   BP: 189/84 149/77 158/77   Pulse: 97 90 89   Resp: 23 28 29   Temp: 98.8 °F (37.1 °C)     SpO2: 100% 100% 100%       Physical Exam   Vital signs reviewed. Alert in mild distress with mask in place. Elderly and frail. HEENT: normocephalic atraumatic. Eyes are PERRLA EOMI. Conjunctiva normal.    External ears and nose normal.    Neck: normal external exam. No midline neck or back TTP. Lungs have rhonchi and wheezing with tachypnea. Heart is tachycardic rate and rhythm with no murmurs. Abdomen soft and nontender. Mildly distended. No rebound rigidity or guarding. Extremities: Moves all 4 extremities and no distress. Full range of motion. 2+ pulses and BCR in all 4 extremities. Neuro: Normal gait. 5 out of 5 strength in all 4 extremities. No facial droop. Skin examination: intact. no rashes. No petechia or purpura.       Medications   sodium chloride (NS) flush 5-10 mL (has no administration in time range)   cefTRIAXone (ROCEPHIN) 2 g in 0.9% sodium chloride (MBP/ADV) 50 mL MBP (2 g IntraVENous New Bag 8/5/20 2006)   azithromycin (ZITHROMAX) 500 mg in 0.9% sodium chloride 250 mL IVPB (500 mg IntraVENous New Bag 8/5/20 2005)   naloxone [facility]) injection 0.4 mg (has no administration in time range)   albuterol (PROVENTIL VENTOLIN) nebulizer solution 5 mg (5 mg Nebulization Given 8/5/20 1744)   morphine injection 4 mg (4 mg IntraVENous Given 8/5/20 2006)   0.9% sodium chloride infusion 100 mL (0 mL IntraVENous IV Completed 8/5/20 1946)   iopamidoL (ISOVUE-370) 76 % injection 100 mL (100 mL IntraVENous Given 8/5/20 1944)       RESULTS:    Labs -   Labs Reviewed   METABOLIC PANEL, COMPREHENSIVE - Abnormal; Notable for the following components:       Result Value    Sodium 126 (*)     Chloride 94 (*)     Glucose 122 (*)     BUN/Creatinine ratio 11 (*)     Calcium 8.2 (*)     Bilirubin, total 1.8 (*)     AST (SGOT) 92 (*)     Globulin 4.6 (*)     All other components within normal limits   CBC WITH AUTOMATED DIFF - Abnormal; Notable for the following components:    RBC 3.81 (*) HGB 11.7 (*)     HCT 33.9 (*)     NEUTROPHILS 79 (*)     LYMPHOCYTES 11 (*)     ABS. NEUTROPHILS 10.2 (*)     ABS. MONOCYTES 1.3 (*)     All other components within normal limits   NT-PRO BNP - Abnormal; Notable for the following components:    NT pro-BNP 2,456 (*)     All other components within normal limits   CULTURE, BLOOD   CULTURE, BLOOD   TROPONIN I   URINALYSIS W/ RFLX MICROSCOPIC   LACTIC ACID       Radiologic Studies -  No results found. EKG interpretation:   Done at 1713 read by myself as normal sinus rhythm at 94 bpm.  No ST elevation. Nonspecific T waves. MEDICAL DECISION MAKING    Sepsis orders started. Patient with CTs pending. eval for pna, ptx, pulmonary edema, PE, dissection/aneursym. Eval for fracture. Patient with back pain since fall 2 days ago. Now with SIRS criteria. Blood cx obtained. IV Abx started after obtained. Pt with CHF. Will hold IVFs unless elevated lactate. Concern for worsening CHF with fluid administration. BP doing well here. No hypotension seen. 1830. Patient reassessed. Updated partial results. Agrees with plan for CTs. Requesting additional pain medicine for his back.  2000. Patient signed out to Dr. Meghna Agrawal pending results. CTs still pending. Lactate ordered earlier. Did not cross over. Will add a lactate sent to lab. Diagnosis   Clinical Impression:   1. Shortness of breath    2. SIRS (systemic inflammatory response syndrome) (HCC)    3. Acute bilateral low back pain without sciatica              currently in stable and improved condition. This chart was completed using Dragon, a dictation transcription service. Errors may have resulted from using this device.

## 2020-08-05 NOTE — LETTER
NOTIFICATION RETURN TO WORK / SCHOOL 
 
8/5/2020 10:24 PM 
 
Mr. Eitan Hartman [phone] [id number] To Whom It May Concern: 
 
Eitan Pisano is currently under the care of [facility] EMERGENCY DEPT. He will return to work/school on: 8/12/20 If there are questions or concerns please have the patient contact our office. Sincerely, Selena Galeano MD

## 2020-08-06 ENCOUNTER — PATIENT OUTREACH (OUTPATIENT)
Dept: CASE MANAGEMENT | Age: 70
End: 2020-08-06

## 2020-08-06 NOTE — ED NOTES
Vitals:  Patient Vitals for the past 12 hrs:   Temp Pulse Resp BP SpO2   08/05/20 1730  89 29 158/77 100 %   08/05/20 1715  90 28 149/77 100 %   08/05/20 1706 98.8 °F (37.1 °C) 97 23 189/84 100 %         Medications ordered:   Medications   sodium chloride (NS) flush 5-10 mL (has no administration in time range)   cefTRIAXone (ROCEPHIN) 2 g in 0.9% sodium chloride (MBP/ADV) 50 mL MBP (2 g IntraVENous New Bag 8/5/20 2006)   azithromycin (ZITHROMAX) 500 mg in 0.9% sodium chloride 250 mL IVPB (500 mg IntraVENous New Bag 8/5/20 2005)   naloxone [facility]) injection 0.4 mg (has no administration in time range)   lidocaine 4 % patch 2 Patch (has no administration in time range)   albuterol (PROVENTIL VENTOLIN) nebulizer solution 5 mg (5 mg Nebulization Given 8/5/20 1744)   morphine injection 4 mg (4 mg IntraVENous Given 8/5/20 2006)   0.9% sodium chloride infusion 100 mL (0 mL IntraVENous IV Completed 8/5/20 1946)   iopamidoL (ISOVUE-370) 76 % injection 100 mL (100 mL IntraVENous Given 8/5/20 1944)   HYDROcodone-acetaminophen (NORCO) 5-325 mg per tablet 1 Tab (1 Tab Oral Given 8/5/20 2217)         Lab findings:  Recent Results (from the past 12 hour(s))   CULTURE, BLOOD    Collection Time: 08/05/20  5:13 PM    Specimen: Blood   Result Value Ref Range    Special Requests: PERIPHERAL      Culture result: PENDING    METABOLIC PANEL, COMPREHENSIVE    Collection Time: 08/05/20  5:13 PM   Result Value Ref Range    Sodium 126 (L) 136 - 145 mmol/L    Potassium 3.8 3.5 - 5.5 mmol/L    Chloride 94 (L) 100 - 111 mmol/L    CO2 26 21 - 32 mmol/L    Anion gap 6 3.0 - 18 mmol/L    Glucose 122 (H) 74 - 99 mg/dL    BUN 9 7.0 - 18 MG/DL    Creatinine 0.84 0.6 - 1.3 MG/DL    BUN/Creatinine ratio 11 (L) 12 - 20      GFR est AA >60 >60 ml/min/1.73m2    GFR est non-AA >60 >60 ml/min/1.73m2    Calcium 8.2 (L) 8.5 - 10.1 MG/DL    Bilirubin, total 1.8 (H) 0.2 - 1.0 MG/DL    ALT (SGPT) 51 16 - 61 U/L    AST (SGOT) 92 (H) 10 - 38 U/L    Alk. phosphatase 106 45 - 117 U/L    Protein, total 8.2 6.4 - 8.2 g/dL    Albumin 3.6 3.4 - 5.0 g/dL    Globulin 4.6 (H) 2.0 - 4.0 g/dL    A-G Ratio 0.8 0.8 - 1.7     CBC WITH AUTOMATED DIFF    Collection Time: 08/05/20  5:13 PM   Result Value Ref Range    WBC 12.9 4.6 - 13.2 K/uL    RBC 3.81 (L) 4.70 - 5.50 M/uL    HGB 11.7 (L) 13.0 - 16.0 g/dL    HCT 33.9 (L) 36.0 - 48.0 %    MCV 89.0 74.0 - 97.0 FL    MCH 30.7 24.0 - 34.0 PG    MCHC 34.5 31.0 - 37.0 g/dL    RDW 13.0 11.6 - 14.5 %    PLATELET 353 802 - 395 K/uL    MPV 10.2 9.2 - 11.8 FL    NEUTROPHILS 79 (H) 40 - 73 %    LYMPHOCYTES 11 (L) 21 - 52 %    MONOCYTES 10 3 - 10 %    EOSINOPHILS 0 0 - 5 %    BASOPHILS 0 0 - 2 %    ABS. NEUTROPHILS 10.2 (H) 1.8 - 8.0 K/UL    ABS. LYMPHOCYTES 1.4 0.9 - 3.6 K/UL    ABS. MONOCYTES 1.3 (H) 0.05 - 1.2 K/UL    ABS. EOSINOPHILS 0.0 0.0 - 0.4 K/UL    ABS.  BASOPHILS 0.0 0.0 - 0.1 K/UL    DF AUTOMATED     NT-PRO BNP    Collection Time: 08/05/20  5:13 PM   Result Value Ref Range    NT pro-BNP 2,456 (H) 0 - 900 PG/ML   TROPONIN I    Collection Time: 08/05/20  5:13 PM   Result Value Ref Range    Troponin-I, QT 0.04 0.0 - 0.045 NG/ML   EKG, 12 LEAD, INITIAL    Collection Time: 08/05/20  5:13 PM   Result Value Ref Range    Ventricular Rate 94 BPM    Atrial Rate 94 BPM    P-R Interval 154 ms    QRS Duration 104 ms    Q-T Interval 388 ms    QTC Calculation (Bezet) 485 ms    Calculated P Axis 68 degrees    Calculated R Axis 59 degrees    Calculated T Axis -87 degrees    Diagnosis       Normal sinus rhythm  Left ventricular hypertrophy with repolarization abnormality ( Sokolow-Vega )  Prolonged QT  Abnormal ECG  When compared with ECG of 04-AUG-2020 04:52,  T wave inversion no longer evident in Anterolateral leads  QT has lengthened     CULTURE, BLOOD    Collection Time: 08/05/20  5:30 PM    Specimen: Blood   Result Value Ref Range    Special Requests: PERIPHERAL      Culture result: PENDING    URINALYSIS W/ RFLX MICROSCOPIC    Collection Time: 08/05/20  8:55 PM   Result Value Ref Range    Color YELLOW      Appearance CLEAR      Specific gravity 1.025 1.005 - 1.030      pH (UA) 7.0 5.0 - 8.0      Protein Negative NEG mg/dL    Glucose Negative NEG mg/dL    Ketone Negative NEG mg/dL    Bilirubin Negative NEG      Blood Negative NEG      Urobilinogen 0.2 0.2 - 1.0 EU/dL    Nitrites Negative NEG      Leukocyte Esterase Negative NEG         EKG interpretation by ED Physician:      X-Ray, CT or other radiology findings or impressions:  XR CHEST PORT    (Results Pending)   CTA CHEST W OR W WO CONT    (Results Pending)   CT ABD PELV W CONT    (Results Pending)   CTA chest: No acute pulmonary embolus. No acute findings. Pleural thickening. No change from previous CTA. CT abdomen pelvis. Age-indeterminate but new compared to May compression deformity of L2 with retropulsion of bony fragments causing moderate to severe central canal stenosis. Chronic bilateral rib fractures    Progress notes, Consult notes or additional Procedure notes:   Patient with no signs of pneumonia. Patient with recent fall which explains his lumbar compression fracture. Patient with no saddle anesthesia and has good normal tone and no foot drop in his lower extremities. No difficulties with emptying his bladder. No indication for admission at this time. Will place patient on pain medications and follow-up with spine surgery    I have discussed with patient and/or family/sig other the results, interpretation of any imaging if performed, suspected diagnosis and treatment plan to include instructions regarding the diagnoses listed to which understanding was expressed with all questions answered      Reevaluation of patient:   stable    Disposition:  Diagnosis:   1. Shortness of breath    2. Acute bilateral low back pain without sciatica    3.  Compression fracture of L2 vertebra, initial encounter [facility])        Disposition: home    Follow-up Information     Follow up With Specialties Details Why Contact Info    Lisette De Luna MD Orthopedic Surgery Schedule an appointment as soon as possible for a visit  [facility]  [address] [place] [address]      Rowan Oliveros MD Family Medicine Schedule an appointment as soon as possible for a visit  [address]  [place] [address]  [phone]              Patient's Medications   Start Taking    HYDROCODONE-ACETAMINOPHEN (NORCO) 5-325 MG PER TABLET    Take 1 Tab by mouth every eight (8) hours as needed for Pain for up to 5 days. Max Daily Amount: 3 Tabs. Continue Taking    ASPIRIN DELAYED-RELEASE 81 MG TABLET    Take 81 mg by mouth daily. LIDOCAINE (LIDODERM) 5 %    2 Patches by TransDERmal route every twelve (12) hours. Apply patch to the affected area for 12 hours a day and remove for 12 hours a day. METHOCARBAMOL (ROBAXIN) 500 MG TABLET    Take 1 Tab by mouth four (4) times daily. NAPROXEN (NAPROSYN) 500 MG TABLET    Take 1 Tab by mouth two (2) times daily (with meals) for 7 days. OMEPRAZOLE DELAYED RELEASE (PRILOSEC D/R) 20 MG TABLET    Take 1 Tab by mouth daily. ONDANSETRON (ZOFRAN ODT) 4 MG DISINTEGRATING TABLET    Take 1 Tab by mouth every eight (8) hours as needed for Nausea or Vomiting. These Medications have changed    No medications on file   Stop Taking    ACETAMINOPHEN (TYLENOL EXTRA STRENGTH) 500 MG TABLET    Take 2 Tabs by mouth every six (6) hours as needed for Pain.

## 2020-08-07 LAB
ATRIAL RATE: 94 BPM
CALCULATED P AXIS, ECG09: 68 DEGREES
CALCULATED R AXIS, ECG10: 59 DEGREES
CALCULATED T AXIS, ECG11: -87 DEGREES
DIAGNOSIS, 93000: NORMAL
P-R INTERVAL, ECG05: 154 MS
Q-T INTERVAL, ECG07: 388 MS
QRS DURATION, ECG06: 104 MS
QTC CALCULATION (BEZET), ECG08: 485 MS
VENTRICULAR RATE, ECG03: 94 BPM

## 2020-08-11 LAB
BACTERIA SPEC CULT: NORMAL
BACTERIA SPEC CULT: NORMAL
SERVICE CMNT-IMP: NORMAL
SERVICE CMNT-IMP: NORMAL

## 2020-08-23 ENCOUNTER — PATIENT OUTREACH (OUTPATIENT)
Dept: CASE MANAGEMENT | Age: 70
End: 2020-08-23

## 2020-10-06 ENCOUNTER — HOSPITAL ENCOUNTER (EMERGENCY)
Age: 70
Discharge: LEFT BEFORE DISCHARGE | End: 2020-10-07
Attending: EMERGENCY MEDICINE
Payer: MEDICARE

## 2020-10-06 ENCOUNTER — APPOINTMENT (OUTPATIENT)
Dept: GENERAL RADIOLOGY | Age: 70
End: 2020-10-06
Attending: EMERGENCY MEDICINE
Payer: MEDICARE

## 2020-10-06 VITALS
BODY MASS INDEX: 26.58 KG/M2 | TEMPERATURE: 98 F | RESPIRATION RATE: 16 BRPM | HEART RATE: 78 BPM | WEIGHT: 180 LBS | OXYGEN SATURATION: 98 %

## 2020-10-06 DIAGNOSIS — I50.9 CONGESTIVE HEART FAILURE, UNSPECIFIED HF CHRONICITY, UNSPECIFIED HEART FAILURE TYPE (HCC): Primary | ICD-10-CM

## 2020-10-06 PROCEDURE — 93005 ELECTROCARDIOGRAM TRACING: CPT

## 2020-10-06 PROCEDURE — 74011250637 HC RX REV CODE- 250/637: Performed by: EMERGENCY MEDICINE

## 2020-10-06 PROCEDURE — 99284 EMERGENCY DEPT VISIT MOD MDM: CPT

## 2020-10-06 PROCEDURE — 71045 X-RAY EXAM CHEST 1 VIEW: CPT

## 2020-10-06 RX ORDER — GUAIFENESIN 100 MG/5ML
324 LIQUID (ML) ORAL
Status: COMPLETED | OUTPATIENT
Start: 2020-10-06 | End: 2020-10-06

## 2020-10-06 RX ADMIN — ASPIRIN 81 MG CHEWABLE TABLET 324 MG: 81 TABLET CHEWABLE at 23:37

## 2020-10-07 LAB
ANION GAP SERPL CALC-SCNC: 1 MMOL/L (ref 3–18)
ATRIAL RATE: 76 BPM
BASOPHILS # BLD: 0 K/UL (ref 0–0.1)
BASOPHILS NFR BLD: 0 % (ref 0–2)
BNP SERPL-MCNC: 981 PG/ML (ref 0–900)
BUN SERPL-MCNC: 16 MG/DL (ref 7–18)
BUN/CREAT SERPL: 14 (ref 12–20)
CALCIUM SERPL-MCNC: 8.3 MG/DL (ref 8.5–10.1)
CALCULATED P AXIS, ECG09: 55 DEGREES
CALCULATED R AXIS, ECG10: 63 DEGREES
CALCULATED T AXIS, ECG11: -81 DEGREES
CHLORIDE SERPL-SCNC: 109 MMOL/L (ref 100–111)
CO2 SERPL-SCNC: 28 MMOL/L (ref 21–32)
CREAT SERPL-MCNC: 1.12 MG/DL (ref 0.6–1.3)
DIAGNOSIS, 93000: NORMAL
DIFFERENTIAL METHOD BLD: ABNORMAL
EOSINOPHIL # BLD: 0.1 K/UL (ref 0–0.4)
EOSINOPHIL NFR BLD: 3 % (ref 0–5)
ERYTHROCYTE [DISTWIDTH] IN BLOOD BY AUTOMATED COUNT: 16.4 % (ref 11.6–14.5)
GLUCOSE SERPL-MCNC: 89 MG/DL (ref 74–99)
HCT VFR BLD AUTO: 32.6 % (ref 36–48)
HGB BLD-MCNC: 10.9 G/DL (ref 13–16)
LYMPHOCYTES # BLD: 1.5 K/UL (ref 0.9–3.6)
LYMPHOCYTES NFR BLD: 30 % (ref 21–52)
MCH RBC QN AUTO: 30.5 PG (ref 24–34)
MCHC RBC AUTO-ENTMCNC: 33.4 G/DL (ref 31–37)
MCV RBC AUTO: 91.3 FL (ref 74–97)
MONOCYTES # BLD: 0.9 K/UL (ref 0.05–1.2)
MONOCYTES NFR BLD: 17 % (ref 3–10)
NEUTS SEG # BLD: 2.6 K/UL (ref 1.8–8)
NEUTS SEG NFR BLD: 50 % (ref 40–73)
P-R INTERVAL, ECG05: 142 MS
PLATELET # BLD AUTO: 267 K/UL (ref 135–420)
PMV BLD AUTO: 10.4 FL (ref 9.2–11.8)
POTASSIUM SERPL-SCNC: 4.7 MMOL/L (ref 3.5–5.5)
Q-T INTERVAL, ECG07: 396 MS
QRS DURATION, ECG06: 104 MS
QTC CALCULATION (BEZET), ECG08: 445 MS
RBC # BLD AUTO: 3.57 M/UL (ref 4.7–5.5)
SODIUM SERPL-SCNC: 138 MMOL/L (ref 136–145)
TROPONIN I SERPL-MCNC: <0.02 NG/ML (ref 0–0.04)
VENTRICULAR RATE, ECG03: 76 BPM
WBC # BLD AUTO: 5.2 K/UL (ref 4.6–13.2)

## 2020-10-07 PROCEDURE — 80048 BASIC METABOLIC PNL TOTAL CA: CPT

## 2020-10-07 PROCEDURE — 85025 COMPLETE CBC W/AUTO DIFF WBC: CPT

## 2020-10-07 PROCEDURE — 84484 ASSAY OF TROPONIN QUANT: CPT

## 2020-10-07 PROCEDURE — 83880 ASSAY OF NATRIURETIC PEPTIDE: CPT

## 2020-10-07 NOTE — ED NOTES
Pt approached registration stating that he did not want to wait to be seen. Advised pt that we have already drawn blood and it would be wise to at least wait until labs were back. MD made aware that pt wants to leave.

## 2020-10-07 NOTE — ED PROVIDER NOTES
EMERGENCY DEPARTMENT HISTORY AND PHYSICAL EXAM      Date: 10/6/2020  Patient Name: Donnie Kevin    History of Presenting Illness     Chief Complaint   Patient presents with    Respiratory Distress       History (Context): Donnie Kevin is a 71 y.o. gentleman with COPD, CAD, history of NSTEMI, alcoholism, who denies that he has all of this, who presents with acute onset, persistent, severe dyspnea and mild chest pressure started several hours ago. No exacerbating/relieving features or other associated symptoms. On review of systems, the patient denies chest pain, back pain, leg swelling, leg pain, recent travel, fever, chills, trauma, back pain, abdominal pain, nausea, vomiting, diaphoresis. PCP: Rylie Hilario MD    Current Outpatient Medications   Medication Sig Dispense Refill    lidocaine (LIDODERM) 5 % 2 Patches by TransDERmal route every twelve (12) hours. Apply patch to the affected area for 12 hours a day and remove for 12 hours a day. 30 Patch 0    methocarbamoL (Robaxin) 500 mg tablet Take 1 Tab by mouth four (4) times daily. 20 Tab 0    ondansetron (ZOFRAN ODT) 4 mg disintegrating tablet Take 1 Tab by mouth every eight (8) hours as needed for Nausea or Vomiting. 12 Tab 0    Omeprazole delayed release (PRILOSEC D/R) 20 mg tablet Take 1 Tab by mouth daily. 30 Tab 1    aspirin delayed-release 81 mg tablet Take 81 mg by mouth daily.          Past History     Past Medical History:  Past Medical History:   Diagnosis Date    COPD (chronic obstructive pulmonary disease) ([address]     ETOH abuse     Glaucoma     HTN (hypertension)        Past Surgical History:  Past Surgical History:   Procedure Laterality Date    HX HEENT      HX WISDOM TEETH EXTRACTION         Family History:  Family History   Problem Relation Age of Onset    Alcohol abuse Other        Social History:  Social History     Tobacco Use    Smoking status: Former Smoker    Smokeless tobacco: Never Used    Tobacco comment: wearing a patch   Substance Use Topics    Alcohol use: Yes     Comment: rarely    Drug use: No       Allergies:  No Known Allergies    PMH, PSH, family history, social history, allergies reviewed with the patient with significant items noted above. Review of Systems   As per HPI, otherwise all systems reviewed and negative. Physical Exam     Vitals:    10/06/20 2330   Pulse: 78   Resp: 16   Temp: 98 °F (36.7 °C)   SpO2: 98%   Weight: 81.6 kg (180 lb)       Gen: Well-appearing in no acute distress  HEENT: Normocephalic, sclera anicteric  Cardiovascular: Normal rate, regular rhythm, no murmurs, rubs, gallops. Pulses intact and equal distally. Pulmonary: Normal work of breathing. Bibasilar crackles that are faint. .  Mildly tachypneic. No stridor. ABD: Soft, nontender, nondistended. Neuro: Alert. Normal speech. Normal mentation. Psych: Normal thought content and thought processes. : No CVA tenderness  EXT: No edema. Moves all extremities well. No cyanosis or clubbing. Skin: Warm and well-perfused.           Diagnostic Study Results     Labs -     Recent Results (from the past 12 hour(s))   EKG, 12 LEAD, INITIAL    Collection Time: 10/06/20 11:53 PM   Result Value Ref Range    Ventricular Rate 76 BPM    Atrial Rate 76 BPM    P-R Interval 142 ms    QRS Duration 104 ms    Q-T Interval 396 ms    QTC Calculation (Bezet) 445 ms    Calculated P Axis 55 degrees    Calculated R Axis 63 degrees    Calculated T Axis -81 degrees    Diagnosis       Normal sinus rhythm  Left ventricular hypertrophy with repolarization abnormality ( Sokolow-Vega ,   Romhilt-Sullivan )  Abnormal ECG  When compared with ECG of 05-AUG-2020 17:13,  No significant change was found     CBC WITH AUTOMATED DIFF    Collection Time: 10/07/20 12:04 AM   Result Value Ref Range    WBC 5.2 4.6 - 13.2 K/uL    RBC 3.57 (L) 4.70 - 5.50 M/uL    HGB 10.9 (L) 13.0 - 16.0 g/dL    HCT 32.6 (L) 36.0 - 48.0 %    MCV 91.3 74.0 - 97.0 FL    MCH 30.5 24.0 - 34.0 PG    MCHC 33.4 31.0 - 37.0 g/dL    RDW 16.4 (H) 11.6 - 14.5 %    PLATELET 243 548 - 274 K/uL    MPV 10.4 9.2 - 11.8 FL    NEUTROPHILS 50 40 - 73 %    LYMPHOCYTES 30 21 - 52 %    MONOCYTES 17 (H) 3 - 10 %    EOSINOPHILS 3 0 - 5 %    BASOPHILS 0 0 - 2 %    ABS. NEUTROPHILS 2.6 1.8 - 8.0 K/UL    ABS. LYMPHOCYTES 1.5 0.9 - 3.6 K/UL    ABS. MONOCYTES 0.9 0.05 - 1.2 K/UL    ABS. EOSINOPHILS 0.1 0.0 - 0.4 K/UL    ABS. BASOPHILS 0.0 0.0 - 0.1 K/UL    DF AUTOMATED     METABOLIC PANEL, BASIC    Collection Time: 10/07/20 12:04 AM   Result Value Ref Range    Sodium 138 136 - 145 mmol/L    Potassium 4.7 3.5 - 5.5 mmol/L    Chloride 109 100 - 111 mmol/L    CO2 28 21 - 32 mmol/L    Anion gap 1 (L) 3.0 - 18 mmol/L    Glucose 89 74 - 99 mg/dL    BUN 16 7.0 - 18 MG/DL    Creatinine 1.12 0.6 - 1.3 MG/DL    BUN/Creatinine ratio 14 12 - 20      GFR est AA >60 >60 ml/min/1.73m2    GFR est non-AA >60 >60 ml/min/1.73m2    Calcium 8.3 (L) 8.5 - 10.1 MG/DL   TROPONIN I    Collection Time: 10/07/20 12:04 AM   Result Value Ref Range    Troponin-I, QT <0.02 0.0 - 0.045 NG/ML   NT-PRO BNP    Collection Time: 10/07/20 12:04 AM   Result Value Ref Range    NT pro- (H) 0 - 900 PG/ML       Radiologic Studies -   XR CHEST PORT    (Results Pending)     CT Results  (Last 48 hours)    None        CXR Results  (Last 48 hours)    None            Medical Decision Making   I am the first provider for this patient. I reviewed the vital signs, available nursing notes, past medical history, past surgical history, family history and social history. Vital Signs-Reviewed the patient's vital signs. EKG: Interpreted by myself. Records Reviewed: Personally, on initial evaluation    MDM:   Patient presents with dyspnea;. Exam significant for bibasilar crackles, slight tachypnea.    DDX considered: Pneumothorax, pneumonia, COPD exacerbation, CHF, ACS, anxiety, PE  DDX thought to be less likely but also considered due to high risk condition: Anxiety, aortic dissection,  Boerhaave's, pericarditis, mediastinitis    Patient condition on initial evaluation: Stable    Plan:   Oxygen therapy per protocol  Close Observation  Cardiac monitoring  As per orders noted below:  Orders Placed This Encounter    XR CHEST PORT    CBC WITH AUTOMATED DIFF    BASIC METABOLIC PANEL    TROPONIN I    PRO-BNP    URINALYSIS W/ RFLX MICROSCOPIC    CARDIAC MONITORING    EKG, 12 LEAD, INITIAL    aspirin chewable tablet 324 mg          ED Course:   ED Course as of Oct 07 0227   Wed Oct 07, 2020   0010 Concerning for ongoing ischemia    [DT]   0035 Patient cannot be found    [DT]   0144 Patient eloped. He has done this multiple times in the past.    [DT]      ED Course User Index  [DT] Eleonora Beach MD         Diagnosis     Clinical Impression:   1. Congestive heart failure, unspecified HF chronicity, unspecified heart failure type ([facility] [address]        Signed,  China Osman MD  Emergency Physician  [facility]    As a voice dictation software was utilized to dictate this note, minor word transpositions can occur. I apologize for confusing wording and typographic errors. Please feel free to contact me for clarification.

## 2022-10-05 PROBLEM — I10 PRIMARY HYPERTENSION: Status: ACTIVE | Noted: 2022-02-07

## 2022-10-05 PROBLEM — K57.30 DIVERTICULAR DISEASE OF COLON: Status: ACTIVE | Noted: 2022-10-05

## 2022-10-05 PROBLEM — T14.90XA TRAUMA: Status: ACTIVE | Noted: 2021-10-17

## 2022-10-05 PROBLEM — J43.1 PANLOBULAR EMPHYSEMA (HCC): Status: ACTIVE | Noted: 2017-03-13

## 2022-10-05 PROBLEM — R91.8 ABNORMAL CT SCAN OF LUNG: Status: ACTIVE | Noted: 2018-11-20

## 2022-10-05 PROBLEM — S22.070A COMPRESSION FRACTURE OF T10 VERTEBRA (HCC): Status: ACTIVE | Noted: 2021-10-27

## 2022-10-05 PROBLEM — R74.8 ABNORMAL LIVER ENZYMES: Status: ACTIVE | Noted: 2022-10-05

## 2022-10-05 PROBLEM — I71.20 THORACIC AORTIC ANEURYSM, WITHOUT RUPTURE: Status: ACTIVE | Noted: 2022-02-07

## 2022-10-05 PROBLEM — S22.42XA FRACTURE OF MULTIPLE RIBS OF LEFT SIDE: Status: ACTIVE | Noted: 2021-10-27

## 2022-10-05 PROBLEM — J20.8 ACUTE BRONCHITIS DUE TO OTHER SPECIFIED ORGANISMS: Status: ACTIVE | Noted: 2019-03-14

## 2022-10-05 PROBLEM — R55 SYNCOPE AND COLLAPSE: Status: ACTIVE | Noted: 2022-04-21

## 2022-10-05 PROBLEM — S32.020A COMPRESSION FRACTURE OF L2 VERTEBRA (HCC): Status: ACTIVE | Noted: 2020-08-12

## 2022-10-05 PROBLEM — I35.1 AORTIC VALVE REGURGITATION: Status: ACTIVE | Noted: 2022-05-03

## 2025-02-25 NOTE — DISCHARGE INSTRUCTIONS
Patient Education        Compression Fracture of the Spine: Care Instructions  Your Care Instructions     A compression fracture happens when the front part of a spinal bone breaks and collapses. A fall or other accident can cause it. A minor injury or moving the wrong way can cause a break if you have thin or brittle bones (osteoporosis). These types of breaks will heal in 8 to 10 weeks. You will need rest and pain medicines. Your doctor may recommend physical therapy. Some doctors recommend that certain people with compression fractures wear braces. Your doctor also may treat thin or brittle bones. You may need surgery if you have lasting pain or if the bone presses on the spinal cord or nerves. You heal best when you take good care of yourself. Eat a variety of healthy foods, and don't smoke. Follow-up care is a key part of your treatment and safety. Be sure to make and go to all appointments, and call your doctor if you are having problems. It's also a good idea to know your test results and keep a list of the medicines you take. How can you care for yourself at home? · Be safe with medicines. Read and follow all instructions on the label. ? If the doctor gave you a prescription medicine for pain, take it as prescribed. ? If you are not taking a prescription pain medicine, ask your doctor if you can take an over-the-counter medicine. · Talk to your doctor about how to make your bones stronger. You may need medicines or a change in what you eat. · Be active only as directed by your doctor. When should you call for help? [id number] anytime you think you may need emergency care. For example, call if:  · You are unable to move an arm or a leg at all. Call your doctor now or seek immediate medical care if:  · You have new or worse symptoms in your arms, legs, belly, or buttocks. Symptoms may include:  ? Numbness or tingling. ? Weakness. ? Pain. · You lose bladder or bowel control.   · You have belly pain, bloating, vomiting, or nausea. Watch closely for changes in your health, and be sure to contact your doctor if:  · You do not get better as expected. Where can you learn more? Go to http://dagoberto-leydi.info/  Enter P445 in the search box to learn more about \"Compression Fracture of the Spine: Care Instructions. \"  Current as of: March 2, 2020               Content Version: 12.5  © 2006-2020 [employer]. Care instructions adapted under license by [employer] (which disclaims liability or warranty for this information). If you have questions about a medical condition or this instruction, always ask your healthcare professional. [address] any warranty or liability for your use of this information. Detail Level: Detailed Quality 226: Preventive Care And Screening: Tobacco Use: Screening And Cessation Intervention: Patient screened for tobacco use and is an ex/non-smoker Quality 130: Documentation Of Current Medications In The Medical Record: Current Medications Documented

## 2025-06-14 NOTE — PROGRESS NOTES
Date/Time:  8/23/2020 10:35 AM  Attempted to reach patient by telephone. Unable to leave HIPPA compliant message requesting a return call. Patient resolved from Transition of Care episode on 8-23-20  Patient/family has been provided the following resources and education related to COVID-19:                         Signs, symptoms and red flags related to COVID-19            CDC exposure and quarantine guidelines            [facility] exposure contact - [phone]            Contact for their local Department of Health                   No further outreach scheduled with this CTN/ACM. Episode of Care resolved. Patient has this CTN/ACM contact information if future needs arise. no